# Patient Record
Sex: MALE | HISPANIC OR LATINO | Employment: FULL TIME | URBAN - NONMETROPOLITAN AREA
[De-identification: names, ages, dates, MRNs, and addresses within clinical notes are randomized per-mention and may not be internally consistent; named-entity substitution may affect disease eponyms.]

---

## 2017-04-03 ENCOUNTER — OCCUPATIONAL MEDICINE (OUTPATIENT)
Dept: URGENT CARE | Facility: PHYSICIAN GROUP | Age: 42
End: 2017-04-03
Payer: COMMERCIAL

## 2017-04-03 VITALS
OXYGEN SATURATION: 96 % | RESPIRATION RATE: 16 BRPM | TEMPERATURE: 98.8 F | HEIGHT: 66 IN | WEIGHT: 130 LBS | DIASTOLIC BLOOD PRESSURE: 70 MMHG | SYSTOLIC BLOOD PRESSURE: 110 MMHG | HEART RATE: 82 BPM | BODY MASS INDEX: 20.89 KG/M2

## 2017-04-03 DIAGNOSIS — S05.02XA CONJUNCTIVAL ABRASION, LEFT, INITIAL ENCOUNTER: ICD-10-CM

## 2017-04-03 LAB
BREATH ALCOHOL COMMENT: NORMAL
POC BREATHALIZER: 0 PERCENT (ref 0–0.01)

## 2017-04-03 PROCEDURE — 82075 ASSAY OF BREATH ETHANOL: CPT | Mod: 29 | Performed by: PHYSICIAN ASSISTANT

## 2017-04-03 PROCEDURE — 99204 OFFICE O/P NEW MOD 45 MIN: CPT | Mod: 29 | Performed by: PHYSICIAN ASSISTANT

## 2017-04-03 PROCEDURE — 80300: CPT | Performed by: PHYSICIAN ASSISTANT

## 2017-04-03 NOTE — Clinical Note
[facility]   [facility] [place] [address]  Phone: [phone] - Fax: [phone]        [facility] Progress Report and Disability Certification  Date of Service: 4/3/2017   No Show:  No  Date / Time of Next Visit: 4/5/2017   Claim Information   Patient Name: Cesar James  Claim Number:     Employer: [employer]  Date of Injury: 4/1/2017     Insurer / TPA: Bright Mckenzie  ID / SSN:     Occupation:   Diagnosis: The encounter diagnosis was Conjunctival abrasion, left, initial encounter.    Medical Information   Related to Industrial Injury?   Yes   Subjective Complaints:  Bilateral eye redness, foreign body sensation after getting metal shavings in at work 2 days ago   Objective Findings: Eyes: PERRLA, EOM within normal limits, he has mild bilateral conjunctival injection, left worse than right. Both eyes were stained with fluorescein stain, minimal conjunctival abrasion to the inferolateral aspect of his eye, just distal to his iris   Pre-Existing Condition(s):     Assessment:   Initial Visit    Status: Additional Care Required  Comments:follow-up in 2 days  Permanent Disability:     Plan: Medication    Diagnostics:      Comments:       Disability Information   Status: Released to Full Duty    From:  4/3/2017  Through: 4/5/2017 Restrictions are: Temporary   Physical Restrictions   Sitting:    Standing:    Stooping:    Bending:      Squatting:    Walking:    Climbing:    Pushing:      Pulling:    Other:    Reaching Above Shoulder (L):   Reaching Above Shoulder (R):       Reaching Below Shoulder (L):    Reaching Below Shoulder (R):      Not to exceed Weight Limits   Carrying(hrs):   Weight Limit(lb):   Lifting(hrs):   Weight  Limit(lb):     Comments:      Repetitive Actions   Hands: i.e. Fine Manipulations from Grasping:     Feet: i.e. Operating Foot Controls:     Driving / Operate Machinery:     Physician Name: Olaf De La Cruz PA-C  Physician Signature: butch-SHI Leon PA-C e-Signature: Dr. Kang Medellin, Medical Director   Clinic Name / Location: [address] [address] Clinic Phone Number: Dept: [phone]   Appointment Time: 5:00 Pm Visit Start Time: 5:43 PM   Check-In Time:  5:14 Pm Visit Discharge Time:  6:02 PM   Original-Treating Physician or Chiropractor    Page 2-Insurer/TPA    Page 3-Employer    Page 4-Employee

## 2017-04-03 NOTE — MR AVS SNAPSHOT
"        Cesar James   4/3/2017 5:00 PM   Occupational Medicine   MRN: [id number]    Department:  [facility]   Dept Phone:  [phone]    Description:  Male : 1975   Provider:  Olaf De La Cruz PA-C           Reason for Visit     Other DOI 2017 metal in eyes      Allergies as of 4/3/2017     Not on File      You were diagnosed with     Conjunctival abrasion, left, initial encounter   [[id number]]         Vital Signs     Blood Pressure Pulse Temperature Respirations Height Weight    110/70 mmHg 82 37.1 °C (98.8 °F) 16 1.676 m (5' 6\") 58.968 kg (130 lb)    Body Mass Index Oxygen Saturation Smoking Status             20.99 kg/m2 96% Current Every Day Smoker         Basic Information     Date Of Birth Sex Race Ethnicity Preferred Language    1975 Male  or   Origin (Guyanese,Montenegrin,Ivorian,Alejandro, etc) English      Your appointments     2017 10:00 AM   Workers Compensation with [facility] (--)    [address] [address] [phone]              Health Maintenance     Patient has no pending health maintenance at this time      Results     POCT Breath Alcohol Test      Component Value Standard Range & Units    POC Breathalizer 0.000 0.00 - 0.01 Percent    Breath Alcohol Comment                          Current Immunizations     No immunizations on file.      Below and/or attached are the medications your provider expects you to take. Review all of your home medications and newly ordered medications with your provider and/or pharmacist. Follow medication instructions as directed by your provider and/or pharmacist. Please keep your medication list with you and share with your provider. Update the information when medications are discontinued, doses are changed, or new medications (including over-the-counter products) are added; and carry medication information at all times in the event of emergency situations    " Allergies:  No Known Allergies          Medications  Valid as of: April 03, 2017 -  6:13 PM    Generic Name Brand Name Tablet Size Instructions for use    Gentamicin Sulfate (Ointment) GARAMYCIN 0.3 % Place 0.5 Inches in both eyes 3 times a day.        .                 Medicines prescribed today were sent to:     [facility], [place] - [address] [facility] [address]    Phone: [phone] Fax: [phone]    Open 24 Hours?: No      Medication refill instructions:       If your prescription bottle indicates you have medication refills left, it is not necessary to call your provider’s office. Please contact your pharmacy and they will refill your medication.    If your prescription bottle indicates you do not have any refills left, you may request refills at any time through one of the following ways: The online On Networks system (except Urgent Care), by calling your provider’s office, or by asking your pharmacy to contact your provider’s office with a refill request. Medication refills are processed only during regular business hours and may not be available until the next business day. Your provider may request additional information or to have a follow-up visit with you prior to refilling your medication.   *Please Note: Medication refills are assigned a new Rx number when refilled electronically. Your pharmacy may indicate that no refills were authorized even though a new prescription for the same medication is available at the pharmacy. Please request the medicine by name with the pharmacy before contacting your provider for a refill.           On Networks Access Code: [email or web address]  Expires: 5/3/2017  6:13 PM    On Networks  A secure, online tool to manage your health information     [employer]’s On Networks® is a secure, online tool that connects you to your personalized health information from the privacy of your home -- day or night - making it very easy for you to manage  your healthcare. Once the activation process is completed, you can even access your medical information using the FX Bridge josh, which is available for free in the Apple Josh store or Google Play store.     FX Bridge provides the following levels of access (as shown below):   My Chart Features   [facility] Primary [facility] Doctor [facility]  Specialists [facility]  Urgent  Care Non-[facility]  Primary Care  Doctor   Email your healthcare team securely and privately 24/7 X X X    Manage appointments: schedule your next appointment; view details of past/upcoming appointments X      Request prescription refills. X      View recent personal medical records, including lab and immunizations X X X X   View health record, including health history, allergies, medications X X X X   Read reports about your outpatient visits, procedures, consult and ER notes X X X X   See your discharge summary, which is a recap of your hospital and/or ER visit that includes your diagnosis, lab results, and care plan. X X       How to register for FX Bridge:  1. Go to  [email or web address].  2. Click on the Sign Up Now box, which takes you to the New Member Sign Up page. You will need to provide the following information:  a. Enter your FX Bridge Access Code exactly as it appears at the top of this page. (You will not need to use this code after you’ve completed the sign-up process. If you do not sign up before the expiration date, you must request a new code.)   b. Enter your date of birth.   c. Enter your home email address.   d. Click Submit, and follow the next screen’s instructions.  3. Create a FX Bridge ID. This will be your FX Bridge login ID and cannot be changed, so think of one that is secure and easy to remember.  4. Create a FX Bridge password. You can change your password at any time.  5. Enter your Password Reset Question and Answer. This can be used at a later time if you forget your password.   6. Enter your e-mail address. This allows you to receive  e-mail notifications when new information is available in Chideo.  7. Click Sign Up. You can now view your health information.    For assistance activating your Chideo account, call [phone]        Quit Tobacco Information     Do you want to quit using tobacco?    Quitting tobacco decreases risks of cancer, heart and lung disease, increases life expectancy, improves sense of taste and smell, and increases spending money, among other benefits.    If you are thinking about quitting, we can help.  • [facility] Quit Tobacco Program: [phone]  o Program occurs weekly for four weeks and includes pharmacist consultation on products to support quitting smoking or chewing tobacco. A provider referral is needed for pharmacist consultation.  • Tobacco Users Help Hotline: 4-426-QUIT-NOW (710-3451) or https://[place].quitlogix.org/  o Free, confidential telephone and online coaching for [place] residents. Sessions are designed on a schedule that is convenient for you. Eligible clients receive free nicotine replacement therapy.  • Nationally: www.smokefree.gov  o Information and professional assistance to support both immediate and long-term needs as you become, and remain, a non-smoker. Smokefree.gov allows you to choose the help that best fits your needs.

## 2017-04-03 NOTE — Clinical Note
"EMPLOYEE’S CLAIM FOR COMPENSATION/ REPORT OF INITIAL TREATMENT  FORM C-4    EMPLOYEE’S CLAIM - PROVIDE ALL INFORMATION REQUESTED   First Name  Cesar Last Name  Jacob Birthdate                    1975                Sex  male Claim Number   Home Address  [address] Age  41 y.o. Height  1.676 m (5' 6\") Weight  58.968 kg (130 lb) N     [facility] [facility] [place]  [address] Telephone  [phone] (home)    Mailing Address  [address] [facility] [place]  [address] Primary Language Spoken  English    Insurer  Bright Mckenzie Third Party   Bright Mckenzie   Employee's Occupation (Job Title) When Injury or Occupational Disease Occurred      Employer's Name  [employer]  Telephone  [phone]    Employer Address  [address]  [place]  Zip  [address]    Date of Injury  4/1/2017               Hour of Injury  9:00 AM Date Employer Notified  4/1/2017 Last Day of Work after Injury or Occupational Disease  4/3/2017 Supervisor to Whom Injury Reported  Tawanda Escobedo   Address or Location of Accident (if applicable)  [[address] belt]   What were you doing at the time of accident? (if applicable)  Hammering on a  bearing    How did this injury or occupational disease occur? (Be specific an answer in detail. Use additional sheet if necessary)  I was hammering a bearing off a shaft and it broke off sending a piece of cast iron under my safety glasses into eyes   If you believe that you have an occupational disease, when did you first have knowledge of the disability and it relationship to your employment?  NO Witnesses to the Accident  Wilmer Sotelo      Nature of Injury or Occupational Disease  Foreign Body  Part(s) of Body Injured or Affected  Eye (L), Eye (R),     I certify that the above is true and correct to the best of my knowledge and that I have provided this " information in order to obtain the benefits of [place] or [place] of [place]).  I hereby authorize any physician, chiropractor, surgeon, practitioner, or other person, any hospital, including [facility] or [facility], any medical service organization, any insurance company, or other institution or organization to release to each other, any medical or other information, including benefits paid or payable, pertinent to this injury or disease, except information relative to diagnosis, treatment and/or counseling for AIDS, psychological conditions, alcohol or controlled substances, for which I must give specific authorization.  A Photostat of this authorization shall be as valid as the original.     Date   Place   Employee’s Signature   THIS REPORT MUST BE COMPLETED AND MAILED WITHIN 3 WORKING DAYS OF TREATMENT   Place  [facility]  Name of Facility  [facility]   Date  4/3/2017 Diagnosis  (S05.02XA) Conjunctival abrasion, left, initial encounter Is there evidence the injured employee was under the influence of alcohol and/or another controlled substance at the time of accident?   Hour  5:43 PM Description of Injury or Disease  The encounter diagnosis was Conjunctival abrasion, left, initial encounter.     Treatment  Prescription eye ointment  Have you advised the patient to remain off work five days or more? No   X-Ray Findings      If Yes   From Date  To Date      From information given by the employee, together with medical evidence, can you directly connect this injury or occupational disease as job incurred?  Yes If No Full Duty  Yes Modified Duty      Is additional medical care by a physician indicated?  Yes  Comments:follow-up in 2 days If Modified Duty, Specify any Limitations / Restrictions      Do you know of any previous injury or disease contributing to this condition or occupational disease?         "                    No   Date  4/3/2017 Print Doctor’s Name Shi De La Cruz PA-C I certify the employer’s copy of  this form was mailed on:   Address  [address] Insurer’s Use Only     [facility]  [address]    Provider’s Tax ID Number  [id number]  Telephone  Dept: [phone]        e-SignSTASHI GUEVARA PA-C   e-Signature: Dr. Kang Medellin, Medical Director Degree           ORIGINAL-TREATING PHYSICIAN OR CHIROPRACTOR    PAGE 2-INSURER/TPA    PAGE 3-EMPLOYER    PAGE 4-EMPLOYEE             Form C-4 (rev10/07)              BRIEF DESCRIPTION OF RIGHTS AND BENEFITS  (Pursuant to [place] 616C.050)    Notice of Injury or Occupational Disease (Incident Report Form C-1): If an injury or occupational disease (OD) arises out of and in the  course of employment, you must provide written notice to your employer as soon as practicable, but no later than 7 days after the accident or  OD. Your employer shall maintain a sufficient supply of the required forms.    Claim for Compensation (Form C-4): If medical treatment is sought, the form C-4 is available at the place of initial treatment. A completed  \"Claim for Compensation\" (Form C-4) must be filed within 90 days after an accident or OD. The treating physician or chiropractor must,  within 3 working days after treatment, complete and mail to the employer, the employer's insurer and third-party , the Claim for  Compensation.    Medical Treatment: If you require medical treatment for your on-the-job injury or OD, you may be required to select a physician or  chiropractor from a list provided by your workers’ compensation insurer, if it has contracted with an Organization for Managed Care (MCO) or  Preferred Provider Organization (PPO) or providers of health care. If your employer has not entered into a contract with an MCO or PPO, you  may select a physician or chiropractor from the Panel of Physicians and Chiropractors. Any medical costs " related to your industrial injury or  OD will be paid by your insurer.    Temporary Total Disability (TTD): If your doctor has certified that you are unable to work for a period of at least 5 consecutive days, or 5  cumulative days in a 20-day period, or places restrictions on you that your employer does not accommodate, you may be entitled to TTD  compensation.    Temporary Partial Disability (TPD): If the wage you receive upon reemployment is less than the compensation for TTD to which you are  entitled, the insurer may be required to pay you TPD compensation to make up the difference. TPD can only be paid for a maximum of 24  months.    Permanent Partial Disability (PPD): When your medical condition is stable and there is an indication of a PPD as a result of your injury or  OD, within 30 days, your insurer must arrange for an evaluation by a rating physician or chiropractor to determine the degree of your PPD. The  amount of your PPD award depends on the date of injury, the results of the PPD evaluation and your age and wage.    Permanent Total Disability (PTD): If you are medically certified by a treating physician or chiropractor as permanently and totally disabled  and have been granted a PTD status by your insurer, you are entitled to receive monthly benefits not to exceed 66 2/3% of your average  monthly wage. The amount of your PTD payments is subject to reduction if you previously received a PPD award.    Vocational Rehabilitation Services: You may be eligible for vocational rehabilitation services if you are unable to return to the job due to a  permanent physical impairment or permanent restrictions as a result of your injury or occupational disease.    Transportation and Per Trent Reimbursement: You may be eligible for travel expenses and per trent associated with medical treatment.    Reopening: You may be able to reopen your claim if your condition worsens after claim closure.    Appeal Process: If you  disagree with a written determination issued by the insurer or the insurer does not respond to your request, you may  appeal to the Department of Administration, , by following the instructions contained in your determination letter. You must  appeal the determination within 70 days from the date of the determination letter at [address], [address], [place], [place]  [address], or [address], [place], [place] [address]. If you disagree with the  decision, you may appeal to the  Department of Administration, . You must file your appeal within 30 days from the date of the  decision  letter at [address], [address], [place], [place] [address], or [address] [facility], [address], [place], [place] [address]. If you  disagree with a decision of an , you may file a petition for judicial review with the District Court. You must do so within 30  days of the Appeal Officer’s decision. You may be represented by an  at your own expense or you may contact the [facility] for possible  representation.    [place]  for Injured Workers (NAIW): If you disagree with a  decision, you may request that NAIW represent you  without charge at an  Hearing. For information regarding denial of benefits, you may contact the [facility] at: [address], [address], [place] [address], [phone], or [address], [place] [address], [phone]    To File a Complaint with the Division: If you wish to file a complaint with the  of the Division of Industrial Relations (DIR),  please contact the Workers’ Compensation Section, [address], [address], [place], [place] [address], telephone [phone], or  [address] [facility] [address], [place], [place] [address], telephone [phone].    For assistance with Workers’ Compensation  Issues: you may contact the Office of the Governor Consumer Health Assistance, [address] [place] [address], [place] [address], [place] [address], Toll Free 1-464.788.9967, Web site: http://govcha.[facility].nv., E-mail  [email or web address].[email or web address].                                                                                                                                                                                                                                   __________________________________________________________________                                                                   _________________                Employee Name / Signature                                                                                                                                                       Date                                                                                                                                                                                                     D-2 (rev. 10/07)

## 2017-04-04 NOTE — PROGRESS NOTES
"Chief Complaint   Patient presents with   • Other     DOI 4/01/2017 metal in eyes       HISTORY OF PRESENT ILLNESS: Patient is a 41 y.o. male who presents today because he feels like he has something in his left eye. He is at work 2 days ago and was having a hammer against a ball bearing and felt a piece of metal break, he felt like he has metal shavings in his eye. He did immediately rinsed his eye. He was wearing safety glasses. He comes in today with eye redness, irritation, foreign body sensation in the left, and to a lesser extent the right. He has been flushing his eye regularly since the incident.    There are no active problems to display for this patient.      Allergies:Review of patient's allergies indicates not on file.    Current Outpatient Prescriptions Ordered in Typekit   Medication Sig Dispense Refill   • gentamicin (GARAMYCIN) 0.3 % Ointment Place 0.5 Inches in both eyes 3 times a day. 1 Tube 0     No current Epic-ordered facility-administered medications on file.       History reviewed. No pertinent past medical history.    Social History   Substance Use Topics   • Smoking status: Current Every Day Smoker -- 0.50 packs/day for 3 years     Types: Cigarettes   • Smokeless tobacco: Never Used   • Alcohol Use: Yes       No family status information on file.   History reviewed. No pertinent family history.    ROS:  Review of Systems   Constitutional: Negative for fever, chills, weight loss and malaise/fatigue.   HENT: Negative for ear pain, nosebleeds, congestion, sore throat and neck pain.    Eyes: Positive for mildly blurred vision, redness and irritation to his eyes bilaterally, left worse than right  Respiratory: Negative for cough, sputum production, shortness of breath and wheezing.    Cardiovascular: Negative for chest pain, palpitations, orthopnea and leg swelling.     Exam:  Blood pressure 110/70, pulse 82, temperature 37.1 °C (98.8 °F), resp. rate 16, height 1.676 m (5' 6\"), weight 58.968 kg (130 " lb), SpO2 96 %.  General:  Well nourished, well developed male in NAD  Head:Normocephalic, atraumatic  Eyes: PERRLA, EOM within normal limits, he has mild bilateral conjunctival injection, left worse than right. Both eyes were stained with fluorescein stain, minimal conjunctival abrasion to the inferolateral aspect of his eye, just distal to his iris  Extremities: no clubbing, cyanosis, or edema.    Please note that this dictation was created using voice recognition software. I have made every reasonable attempt to correct obvious errors, but I expect that there are errors of grammar and possibly content that I did not discover before finalizing the note.    Assessment/Plan:  1. Conjunctival abrasion, left, initial encounter  gentamicin (GARAMYCIN) 0.3 % Ointment     Follow-up in 2 days  .

## 2017-04-05 ENCOUNTER — OCCUPATIONAL MEDICINE (OUTPATIENT)
Dept: URGENT CARE | Facility: PHYSICIAN GROUP | Age: 42
End: 2017-04-05
Payer: COMMERCIAL

## 2017-04-05 VITALS
HEIGHT: 66 IN | TEMPERATURE: 98.6 F | DIASTOLIC BLOOD PRESSURE: 68 MMHG | HEART RATE: 72 BPM | WEIGHT: 130 LBS | RESPIRATION RATE: 18 BRPM | BODY MASS INDEX: 20.89 KG/M2 | SYSTOLIC BLOOD PRESSURE: 114 MMHG | OXYGEN SATURATION: 96 %

## 2017-04-05 DIAGNOSIS — S05.02XD CONJUNCTIVAL ABRASION, LEFT, SUBSEQUENT ENCOUNTER: ICD-10-CM

## 2017-04-05 PROCEDURE — 99213 OFFICE O/P EST LOW 20 MIN: CPT | Performed by: PHYSICIAN ASSISTANT

## 2017-04-05 ASSESSMENT — ENCOUNTER SYMPTOMS
FEVER: 0
DOUBLE VISION: 0
NAUSEA: 0
FOREIGN BODY SENSATION: 0
PHOTOPHOBIA: 0
EYE REDNESS: 1
EYE DISCHARGE: 0
VOMITING: 0
BLURRED VISION: 0
EYE PAIN: 1

## 2017-04-05 NOTE — MR AVS SNAPSHOT
"        Cesar James   2017 10:00 AM   Occupational Medicine   MRN: [id number]    Department:  [facility]   Dept Phone:  [phone]    Description:  Male : 1975   Provider:  JENA Webster           Reason for Visit     Eye Injury WC FV      Allergies as of 2017     No Known Allergies      You were diagnosed with     Conjunctival abrasion, left, subsequent encounter   [[id number]]   Much improved. MMI      Vital Signs     Blood Pressure Pulse Temperature Respirations Height Weight    114/68 mmHg 72 37 °C (98.6 °F) 18 1.676 m (5' 5.98\") 58.968 kg (130 lb)    Body Mass Index Oxygen Saturation Smoking Status             20.99 kg/m2 96% Current Every Day Smoker         Basic Information     Date Of Birth Sex Race Ethnicity Preferred Language    1975 Male  or   Origin (Chilean,Barbadian,Jordanian,Alejandro, etc) English      Health Maintenance        Date Due Completion Dates    IMM DTaP/Tdap/Td Vaccine (1 - Tdap) 6/10/1996 ---            Current Immunizations     No immunizations on file.      Below and/or attached are the medications your provider expects you to take. Review all of your home medications and newly ordered medications with your provider and/or pharmacist. Follow medication instructions as directed by your provider and/or pharmacist. Please keep your medication list with you and share with your provider. Update the information when medications are discontinued, doses are changed, or new medications (including over-the-counter products) are added; and carry medication information at all times in the event of emergency situations     Allergies:  No Known Allergies          Medications  Valid as of: 2017 - 10:36 AM    Generic Name Brand Name Tablet Size Instructions for use    Gentamicin Sulfate (Ointment) GARAMYCIN 0.3 % Place 0.5 Inches in both eyes 3 times a day.        .                 Medicines prescribed today were sent to:     [facility] " # - [place], [place] - [address]    [address] [address]    Phone: [phone] Fax: [phone]    Open 24 Hours?: No      Medication refill instructions:       If your prescription bottle indicates you have medication refills left, it is not necessary to call your provider’s office. Please contact your pharmacy and they will refill your medication.    If your prescription bottle indicates you do not have any refills left, you may request refills at any time through one of the following ways: The online Investview system (except Urgent Care), by calling your provider’s office, or by asking your pharmacy to contact your provider’s office with a refill request. Medication refills are processed only during regular business hours and may not be available until the next business day. Your provider may request additional information or to have a follow-up visit with you prior to refilling your medication.   *Please Note: Medication refills are assigned a new Rx number when refilled electronically. Your pharmacy may indicate that no refills were authorized even though a new prescription for the same medication is available at the pharmacy. Please request the medicine by name with the pharmacy before contacting your provider for a refill.           Investview Access Code: [email or web address]  Expires: 5/3/2017  6:13 PM    Investview  A secure, online tool to manage your health information     [employer] Investview® is a secure, online tool that connects you to your personalized health information from the privacy of your home -- day or night - making it very easy for you to manage your healthcare. Once the activation process is completed, you can even access your medical information using the Investview josh, which is available for free in the Apple Josh store or Google Play store.     Investview provides the following levels of access (as shown below):   My Chart Features   [facility] Primary Care Doctor  [facility]  Specialists [facility]  Urgent  Care Non-[facility]  Primary Care  Doctor   Email your healthcare team securely and privately 24/7 X X X    Manage appointments: schedule your next appointment; view details of past/upcoming appointments X      Request prescription refills. X      View recent personal medical records, including lab and immunizations X X X X   View health record, including health history, allergies, medications X X X X   Read reports about your outpatient visits, procedures, consult and ER notes X X X X   See your discharge summary, which is a recap of your hospital and/or ER visit that includes your diagnosis, lab results, and care plan. X X       How to register for Causecast:  1. Go to  [email or web address].  2. Click on the Sign Up Now box, which takes you to the New Member Sign Up page. You will need to provide the following information:  a. Enter your Causecast Access Code exactly as it appears at the top of this page. (You will not need to use this code after you’ve completed the sign-up process. If you do not sign up before the expiration date, you must request a new code.)   b. Enter your date of birth.   c. Enter your home email address.   d. Click Submit, and follow the next screen’s instructions.  3. Create a Causecast ID. This will be your Causecast login ID and cannot be changed, so think of one that is secure and easy to remember.  4. Create a Causecast password. You can change your password at any time.  5. Enter your Password Reset Question and Answer. This can be used at a later time if you forget your password.   6. Enter your e-mail address. This allows you to receive e-mail notifications when new information is available in Causecast.  7. Click Sign Up. You can now view your health information.    For assistance activating your Causecast account, call [phone]        Quit Tobacco Information     Do you want to quit using tobacco?    Quitting tobacco decreases risks of cancer, heart  and lung disease, increases life expectancy, improves sense of taste and smell, and increases spending money, among other benefits.    If you are thinking about quitting, we can help.  • [facility] Quit Tobacco Program: [phone]  o Program occurs weekly for four weeks and includes pharmacist consultation on products to support quitting smoking or chewing tobacco. A provider referral is needed for pharmacist consultation.  • Tobacco Users Help Hotline: -800QUIT-NOW (392-6068) or https://[place].quitlogix.org/  o Free, confidential telephone and online coaching for [place] residents. Sessions are designed on a schedule that is convenient for you. Eligible clients receive free nicotine replacement therapy.  • Nationally: www.smokefree.gov  o Information and professional assistance to support both immediate and long-term needs as you become, and remain, a non-smoker. Smokefree.gov allows you to choose the help that best fits your needs.

## 2017-04-05 NOTE — Clinical Note
[facility]   [address] [place] [address] [place]  ALEXANDRA Chavez [address]  Phone: [phone] - Fax: [phone]        [facility] Progress Report and Disability Certification  Date of Service: 4/5/2017   No Show:  No  Date / Time of Next Visit:     Claim Information   Patient Name: Cesar James  Claim Number:     Employer: [employer]  Date of Injury: 4/1/2017     Insurer / TPA: [employer] Mckenzie  ID / SSN:     Occupation:   Diagnosis: The encounter diagnosis was Conjunctival abrasion, left, subsequent encounter.    Medical Information   Related to Industrial Injury? Yes    Subjective Complaints:  DOI: 4/1/17. Patient reports feeling as if he got metal in his eye while working. He was seen in urgent care 2 days ago and had some abrasions present. He was started on gentamicin ointment, but unfortunately was unable to start the medication until last night at the pharmacy had run out. He has had one dose of the medication and has noticed improvement in his symptoms. Reports occasional mild irritation, but no pain. No changes in vision.   Objective Findings: Very mild conjunctival erythema of the left eye. No obvious abrasions on fluorescein exam. No foreign bodies visualized.   Pre-Existing Condition(s):     Assessment:   Condition Improved    Status: Discharged /  MMI  Permanent Disability:No    Plan:      Diagnostics:      Comments:       Disability Information   Status:      From:     Through:   Restrictions are:     Physical Restrictions   Sitting:    Standing:    Stooping:    Bending:      Squatting:    Walking:    Climbing:    Pushing:      Pulling:    Other:    Reaching Above Shoulder (L):   Reaching Above Shoulder (R):       Reaching Below Shoulder (L):    Reaching Below Shoulder (R):      Not to exceed Weight Limits   Carrying(hrs):   Weight Limit(lb):   Lifting(hrs):   Weight  Limit(lb):     Comments:      Repetitive Actions   Hands: i.e. Fine Manipulations  from Grasping:     Feet: i.e. Operating Foot Controls:     Driving / Operate Machinery:     Physician Name: JENA Webster Physician Signature: KVNG Abad e-Signature: Dr. Kang Medellin, Medical Director   Clinic Name / Location: [address] [address] Clinic Phone Number: Dept: [phone]   Appointment Time: 10:00 Am Visit Start Time: 10:12 AM   Check-In Time:  10:05 Am Visit Discharge Time:  10:35 AM   Original-Treating Physician or Chiropractor    Page 2-Insurer/TPA    Page 3-Employer    Page 4-Employee

## 2017-04-05 NOTE — PROGRESS NOTES
Subjective:      Cesar James is a 41 y.o. male who presents with Eye Injury      DOI: 4/1/17. Patient reports feeling as if he got metal in his eye while working. He was seen in urgent care 2 days ago and had some abrasions present. He was started on gentamicin ointment, but unfortunately was unable to start the medication until last night at the pharmacy had run out. He has had one dose of the medication and has noticed improvement in his symptoms. Reports occasional mild irritation, but no pain. No changes in vision.     Eye Injury   The left eye is affected. This is a new problem. The current episode started in the past 7 days. The problem has been gradually improving. The injury mechanism was a foreign body. The patient is experiencing no pain. Associated symptoms include eye redness. Pertinent negatives include no blurred vision, eye discharge, double vision, fever, foreign body sensation, itching, nausea, photophobia, recent URI or vomiting. He has tried commercial eye wash and eye drops for the symptoms. The treatment provided moderate relief.       Review of Systems   Constitutional: Negative for fever.   Eyes: Positive for pain and redness. Negative for blurred vision, double vision, photophobia and discharge.   Gastrointestinal: Negative for nausea and vomiting.   Skin: Negative for itching.     Allergies:Review of patient's allergies indicates no known allergies.    Current Outpatient Prescriptions Ordered in [facility]   Medication Sig Dispense Refill   • gentamicin (GARAMYCIN) 0.3 % Ointment Place 0.5 Inches in both eyes 3 times a day. 1 Tube 0     No current Epic-ordered facility-administered medications on file.       History reviewed. No pertinent past medical history.    Social History   Substance Use Topics   • Smoking status: Current Every Day Smoker -- 0.50 packs/day for 3 years     Types: Cigarettes   • Smokeless tobacco: Never Used   • Alcohol Use: Yes       No family status information on file.  "  History reviewed. No pertinent family history.       Objective:     /68 mmHg  Pulse 72  Temp(Src) 37 °C (98.6 °F)  Resp 18  Ht 1.676 m (5' 5.98\")  Wt 58.968 kg (130 lb)  BMI 20.99 kg/m2  SpO2 96%     Physical Exam   Constitutional: He is oriented to person, place, and time. He appears well-developed and well-nourished. No distress.   HENT:   Head: Normocephalic and atraumatic.   Eyes: EOM are normal. Pupils are equal, round, and reactive to light. Right eye exhibits no discharge. Left eye exhibits no discharge.   Mild left conjunctival erythema. No obvious foreign bodies or abrasions   Neck: Normal range of motion. Neck supple.   Cardiovascular: Normal rate.    Pulmonary/Chest: Effort normal.   Neurological: He is alert and oriented to person, place, and time.   Skin: Skin is warm and dry. He is not diaphoretic.   Psychiatric: He has a normal mood and affect. His behavior is normal. Judgment and thought content normal.   Nursing note and vitals reviewed.      Very mild conjunctival erythema of the left eye. No obvious abrasions on fluorescein exam. No foreign bodies visualized.       Assessment/Plan:     1. Conjunctival abrasion, left, subsequent encounter      Much improved. MMI       Please note that this dictation was created using voice recognition software. I have made every reasonable attempt to correct obvious errors, but I expect that there are errors of grammar and possibly content that I did not discover before finalizing the note.      "

## 2019-07-15 ENCOUNTER — NON-PROVIDER VISIT (OUTPATIENT)
Dept: URGENT CARE | Facility: PHYSICIAN GROUP | Age: 44
End: 2019-07-15

## 2019-07-15 DIAGNOSIS — Z02.1 PRE-EMPLOYMENT DRUG SCREENING: ICD-10-CM

## 2019-07-15 LAB
AMP AMPHETAMINE: NORMAL
COC COCAINE: NORMAL
INT CON NEG: NORMAL
INT CON POS: NORMAL
MET METHAMPHETAMINES: NORMAL
OPI OPIATES: NORMAL
PCP PHENCYCLIDINE: NORMAL
POC DRUG COMMENT 753798-OCCUPATIONAL HEALTH: NEGATIVE
THC: NORMAL

## 2019-07-15 PROCEDURE — 80305 DRUG TEST PRSMV DIR OPT OBS: CPT | Performed by: PHYSICIAN ASSISTANT

## 2021-01-12 ENCOUNTER — OFFICE VISIT (OUTPATIENT)
Dept: URGENT CARE | Facility: PHYSICIAN GROUP | Age: 46
End: 2021-01-12
Payer: COMMERCIAL

## 2021-01-12 ENCOUNTER — HOSPITAL ENCOUNTER (OUTPATIENT)
Facility: MEDICAL CENTER | Age: 46
End: 2021-01-12
Attending: PHYSICIAN ASSISTANT
Payer: COMMERCIAL

## 2021-01-12 VITALS
TEMPERATURE: 97.9 F | WEIGHT: 135 LBS | SYSTOLIC BLOOD PRESSURE: 112 MMHG | RESPIRATION RATE: 16 BRPM | HEART RATE: 97 BPM | BODY MASS INDEX: 21.69 KG/M2 | HEIGHT: 66 IN | DIASTOLIC BLOOD PRESSURE: 86 MMHG | OXYGEN SATURATION: 97 %

## 2021-01-12 DIAGNOSIS — R68.89 FLU-LIKE SYMPTOMS: ICD-10-CM

## 2021-01-12 DIAGNOSIS — R06.2 WHEEZING: ICD-10-CM

## 2021-01-12 PROCEDURE — U0003 INFECTIOUS AGENT DETECTION BY NUCLEIC ACID (DNA OR RNA); SEVERE ACUTE RESPIRATORY SYNDROME CORONAVIRUS 2 (SARS-COV-2) (CORONAVIRUS DISEASE [COVID-19]), AMPLIFIED PROBE TECHNIQUE, MAKING USE OF HIGH THROUGHPUT TECHNOLOGIES AS DESCRIBED BY CMS-2020-01-R: HCPCS

## 2021-01-12 PROCEDURE — U0005 INFEC AGEN DETEC AMPLI PROBE: HCPCS

## 2021-01-12 PROCEDURE — 99204 OFFICE O/P NEW MOD 45 MIN: CPT | Performed by: PHYSICIAN ASSISTANT

## 2021-01-12 RX ORDER — DEXAMETHASONE 6 MG/1
6 TABLET ORAL DAILY
Qty: 7 TAB | Refills: 0 | Status: SHIPPED | OUTPATIENT
Start: 2021-01-12 | End: 2021-01-19

## 2021-01-12 NOTE — PROGRESS NOTES
Chief Complaint   Patient presents with   • Coronavirus Screening     fever, headache, body aches, chills, symptoms started on sunday. nausea and vomiting.        HISTORY OF PRESENT ILLNESS: Patient is a 45 y.o. male who presents today for the following:    Fever x 2 days  + body aches, chills, fatigue  Denies cough, SOB  Negative COVID test 1 week ago  OTC meds today: none    There are no active problems to display for this patient.      Allergies:Patient has no known allergies.    Current Outpatient Medications Ordered in Epic   Medication Sig Dispense Refill   • dexamethasone (DECADRON) 6 MG Tab Take 1 Tab by mouth every day for 7 days. 7 Tab 0   • gentamicin (GARAMYCIN) 0.3 % Ointment Place 0.5 Inches in both eyes 3 times a day. (Patient not taking: Reported on 1/12/2021) 1 Tube 0     No current Epic-ordered facility-administered medications on file.        History reviewed. No pertinent past medical history.    Social History     Tobacco Use   • Smoking status: Current Every Day Smoker     Packs/day: 0.50     Years: 3.00     Pack years: 1.50     Types: Cigarettes   • Smokeless tobacco: Never Used   Substance Use Topics   • Alcohol use: Yes   • Drug use: Not on file       No family status information on file.   History reviewed. No pertinent family history.    Review of Systems:    Constitutional ROS: No unexpected change in weight, No weakness, No fatigue  Eye ROS: No recent significant change in vision, No eye pain, redness, discharge  Ear ROS: No drainage, No tinnitus or vertigo, No recent change in hearing  Mouth/Throat ROS: No teeth or gum problems, No bleeding gums, No tongue complaints  Neck ROS: No swollen glands, No significant pain in neck  Pulmonary ROS: No chronic cough, sputum, or hemoptysis, No dyspnea on exertion, No wheezing  Cardiovascular ROS: No diaphoresis, No edema, No palpitations  Musculoskeletal/Extremities ROS: No peripheral edema, No pain, redness or swelling on the  "joints  Hematologic/Lymphatic ROS: No chills, No night sweats, No weight loss  Skin/Integumentary ROS: No edema, No evidence of rash, No itching      Exam:  /86   Pulse 97   Temp 36.6 °C (97.9 °F) (Temporal)   Resp 16   Ht 1.676 m (5' 6\")   Wt 61.2 kg (135 lb)   SpO2 97%   General: Well developed, well nourished. No distress.    Eye: PERRL/EOMI; conjunctivae clear, lids normal.  ENMT: Lips without lesions, MMM. Oropharynx is clear. Bilateral TMs are within normal limits.  Pulmonary: Unlabored respiratory effort.  Diffuse inspiratory wheezes.  Cardiovascular: Regular rate and rhythm without murmur.   Neurologic: Grossly nonfocal. No facial asymmetry noted.  Lymph: No cervical lymphadenopathy noted.  Skin: Warm, dry, good turgor. No rashes in visible areas.   Psych: Normal mood. Alert and oriented to person, place and time.    Assessment/Plan:  Discussed likely viral etiology.  Vitals and exam are unremarkable.  Low suspicion for pneumonia. Patient will be tested for COVID and has been advised to quarantine until results are available.   Encouraged patient to sign [facility]. Sign up information was sent via text message.  Use all medication as directed.  Discussed appropriate over-the-counter symptomatic medication, and when to return to clinic. Follow up for worsening or persistent symptoms.  1. Flu-like symptoms  SARS-CoV-2, PCR (In-House): Collect NP OR nasal swab in VTM   2. Wheezing  dexamethasone (DECADRON) 6 MG Tab       "

## 2021-01-12 NOTE — LETTER
January 12, 2021         Patient: Cesar James   YOB: 1975   Date of Visit: 1/12/2021           To Whom it May Concern:    Cesar James was seen in my clinic on 1/12/2021.     Please excuse patient for missing school/work until COVID results are available, typically taking 1-3 days.  They have been advised to quarantine during this time.      If you have any questions or concerns, please don't hesitate to call.        Sincerely,           More Ponce P.A.-C.  Electronically Signed

## 2021-01-13 DIAGNOSIS — R68.89 FLU-LIKE SYMPTOMS: ICD-10-CM

## 2021-01-13 LAB
COVID ORDER STATUS COVID19: NORMAL
SARS-COV-2 RNA RESP QL NAA+PROBE: NOTDETECTED
SPECIMEN SOURCE: NORMAL

## 2021-06-21 ENCOUNTER — NON-PROVIDER VISIT (OUTPATIENT)
Dept: URGENT CARE | Facility: PHYSICIAN GROUP | Age: 46
End: 2021-06-21

## 2021-06-21 DIAGNOSIS — Z02.83 ENCOUNTER FOR DRUG SCREENING: ICD-10-CM

## 2021-06-21 PROCEDURE — 8907 PR URINE COLLECT ONLY: Performed by: PHYSICIAN ASSISTANT

## 2021-10-16 ENCOUNTER — OFFICE VISIT (OUTPATIENT)
Dept: URGENT CARE | Facility: PHYSICIAN GROUP | Age: 46
End: 2021-10-16
Payer: COMMERCIAL

## 2021-10-16 VITALS
DIASTOLIC BLOOD PRESSURE: 76 MMHG | HEIGHT: 66 IN | SYSTOLIC BLOOD PRESSURE: 102 MMHG | HEART RATE: 65 BPM | TEMPERATURE: 98.6 F | WEIGHT: 133 LBS | OXYGEN SATURATION: 98 % | BODY MASS INDEX: 21.38 KG/M2 | RESPIRATION RATE: 16 BRPM

## 2021-10-16 DIAGNOSIS — L08.9 LOCAL SKIN INFECTION: ICD-10-CM

## 2021-10-16 PROCEDURE — 99213 OFFICE O/P EST LOW 20 MIN: CPT | Performed by: NURSE PRACTITIONER

## 2021-10-16 NOTE — PROGRESS NOTES
Chief Complaint   Patient presents with   • Other     Scabs on nose and chin, [id number]        HISTORY OF PRESENT ILLNESS: Patient is a pleasant 46 y.o. male who presents to urgent care today with complaints of scabs to his face and neck for the past month.  The areas are mildly tender, nonpruritic.  He denies any drainage.  He is recently stopped drinking alcohol and feels his symptoms have worsened since such.  He has not tried medication for symptom relief.  He shares a home with other individuals, no others have similar symptoms.    There are no problems to display for this patient.      Allergies:Patient has no known allergies.    No current outpatient medications on file prior to visit.     No current facility-administered medications on file prior to visit.         History reviewed. No pertinent past medical history.    Social History     Tobacco Use   • Smoking status: Former Smoker     Packs/day: 0.50     Years: 3.00     Pack years: 1.50     Types: Cigarettes   • Smokeless tobacco: Never Used   Substance Use Topics   • Alcohol use: Not Currently   • Drug use: Not on file       No family status information on file.   History reviewed. No pertinent family history.    ROS:  Review of Systems   Constitutional: Negative for fever, chills, weight loss, malaise, and fatigue.   HENT: Negative for ear pain, nosebleeds, congestion, sore throat and neck pain.    Eyes: Negative for vision changes.   Neuro: Negative for headache, sensory changes, weakness, seizure, LOC.   Cardiovascular: Negative for chest pain, palpitations, orthopnea and leg swelling.   Respiratory: Negative for cough, sputum production, shortness of breath and wheezing.   Gastrointestinal: Negative for abdominal pain, nausea, vomiting or diarrhea.   Genitourinary: Negative for dysuria, urgency and frequency.  Musculoskeletal: Negative for falls, neck pain, back pain, joint pain, myalgias.   Skin: Positive for rash. Negative diaphoresis.     Exam:  BP  "102/76   Pulse 65   Temp 37 °C (98.6 °F) (Temporal)   Resp 16   Ht 1.676 m (5' 6\")   Wt 60.3 kg (133 lb)   SpO2 98%   General: well-nourished, well-developed male in NAD  Head: normocephalic, atraumatic  Eyes: PERRLA, no conjunctival injection, acuity grossly intact, lids normal.  Ears: normal shape and symmetry, no tenderness, no discharge. External canals are without any significant edema or erythema. Tympanic membranes are without any inflammation, no effusion. Gross auditory acuity is intact.  Nose: symmetrical without tenderness, no discharge.  Mouth/Throat: reasonable hygiene, no erythema, exudates or tonsillar enlargement.  Neck: no masses, range of motion within normal limits, no tracheal deviation. No obvious thyroid enlargement.   Lymph: no cervical adenopathy. No supraclavicular adenopathy.   Neuro: alert and oriented. Cranial nerves 1-12 grossly intact. No sensory deficit.   Cardiovascular: regular rate and rhythm. No edema.  Pulmonary: no distress. Chest is symmetrical with respiration, no wheezes, crackles, or rhonchi.   Musculoskeletal: no clubbing, appropriate muscle tone, gait is stable.  Skin: warm, dry, intact, no clubbing, no cyanosis.  Small, round, approximately 2 mm scab lesions to bridge of nose, ears, neck.  No active drainage, no surrounding erythema.  Psych: appropriate mood, affect, judgement.         Assessment/Plan:  1. Local skin infection  mupirocin (BACTROBAN) 2 % Ointment       Patient presents with skin lesions for the past month, suspect bacterial process.  Bactroban as directed.  Skin care discussed.  Supportive care, differential diagnoses, and indications for immediate follow-up discussed with patient.   Pathogenesis of diagnosis discussed including typical length and natural progression.   Instructed to return to clinic or nearest emergency department for any change in condition, further concerns, or worsening of symptoms.  Patient states understanding of the plan of " care and discharge instructions.  Instructed to make an appointment, for follow up, with his primary care provider.        Please note that this dictation was created using voice recognition software. I have made every reasonable attempt to correct obvious errors, but I expect that there are errors of grammar and possibly content that I did not discover before finalizing the note.      ANNETTE Gates.

## 2021-11-02 ENCOUNTER — APPOINTMENT (OUTPATIENT)
Dept: URGENT CARE | Facility: PHYSICIAN GROUP | Age: 46
End: 2021-11-02
Payer: COMMERCIAL

## 2022-08-02 ENCOUNTER — NON-PROVIDER VISIT (OUTPATIENT)
Dept: URGENT CARE | Facility: PHYSICIAN GROUP | Age: 47
End: 2022-08-02

## 2022-08-02 DIAGNOSIS — Z02.1 PRE-EMPLOYMENT DRUG SCREENING: ICD-10-CM

## 2022-08-02 PROCEDURE — 80305 DRUG TEST PRSMV DIR OPT OBS: CPT | Performed by: FAMILY MEDICINE

## 2023-09-25 ENCOUNTER — HOSPITAL ENCOUNTER (OUTPATIENT)
Dept: LAB | Facility: MEDICAL CENTER | Age: 48
End: 2023-09-25
Attending: STUDENT IN AN ORGANIZED HEALTH CARE EDUCATION/TRAINING PROGRAM
Payer: COMMERCIAL

## 2023-09-25 LAB
ALBUMIN SERPL BCP-MCNC: 4.3 G/DL (ref 3.2–4.9)
ALBUMIN/GLOB SERPL: 1.5 G/DL
ALP SERPL-CCNC: 63 U/L (ref 30–99)
ALT SERPL-CCNC: 78 U/L (ref 2–50)
ANION GAP SERPL CALC-SCNC: 9 MMOL/L (ref 7–16)
AST SERPL-CCNC: 59 U/L (ref 12–45)
BASOPHILS # BLD AUTO: 0.8 % (ref 0–1.8)
BASOPHILS # BLD: 0.08 K/UL (ref 0–0.12)
BILIRUB SERPL-MCNC: 1 MG/DL (ref 0.1–1.5)
BUN SERPL-MCNC: 10 MG/DL (ref 8–22)
CALCIUM ALBUM COR SERPL-MCNC: 9.4 MG/DL (ref 8.5–10.5)
CALCIUM SERPL-MCNC: 9.6 MG/DL (ref 8.5–10.5)
CHLORIDE SERPL-SCNC: 102 MMOL/L (ref 96–112)
CHOLEST SERPL-MCNC: 145 MG/DL (ref 100–199)
CO2 SERPL-SCNC: 31 MMOL/L (ref 20–33)
CREAT SERPL-MCNC: 1.03 MG/DL (ref 0.5–1.4)
EOSINOPHIL # BLD AUTO: 0.48 K/UL (ref 0–0.51)
EOSINOPHIL NFR BLD: 5 % (ref 0–6.9)
ERYTHROCYTE [DISTWIDTH] IN BLOOD BY AUTOMATED COUNT: 40.5 FL (ref 35.9–50)
FASTING STATUS PATIENT QL REPORTED: NORMAL
GFR SERPLBLD CREATININE-BSD FMLA CKD-EPI: 91 ML/MIN/1.73 M 2
GLOBULIN SER CALC-MCNC: 2.8 G/DL (ref 1.9–3.5)
GLUCOSE SERPL-MCNC: 111 MG/DL (ref 65–99)
HBV CORE AB SERPL QL IA: NONREACTIVE
HBV SURFACE AG SER QL: NORMAL
HCT VFR BLD AUTO: 55.2 % (ref 42–52)
HCV AB SER QL: REACTIVE
HDLC SERPL-MCNC: 33 MG/DL
HGB BLD-MCNC: 19.8 G/DL (ref 14–18)
HIV 1+2 AB+HIV1 P24 AG SERPL QL IA: NORMAL
IMM GRANULOCYTES # BLD AUTO: 0.03 K/UL (ref 0–0.11)
IMM GRANULOCYTES NFR BLD AUTO: 0.3 % (ref 0–0.9)
LDLC SERPL CALC-MCNC: 95 MG/DL
LYMPHOCYTES # BLD AUTO: 3.5 K/UL (ref 1–4.8)
LYMPHOCYTES NFR BLD: 36.3 % (ref 22–41)
MCH RBC QN AUTO: 32.1 PG (ref 27–33)
MCHC RBC AUTO-ENTMCNC: 35.9 G/DL (ref 32.3–36.5)
MCV RBC AUTO: 89.5 FL (ref 81.4–97.8)
MONOCYTES # BLD AUTO: 0.63 K/UL (ref 0–0.85)
MONOCYTES NFR BLD AUTO: 6.5 % (ref 0–13.4)
NEUTROPHILS # BLD AUTO: 4.93 K/UL (ref 1.82–7.42)
NEUTROPHILS NFR BLD: 51.1 % (ref 44–72)
NRBC # BLD AUTO: 0 K/UL
NRBC BLD-RTO: 0 /100 WBC (ref 0–0.2)
PLATELET # BLD AUTO: 249 K/UL (ref 164–446)
PMV BLD AUTO: 10.7 FL (ref 9–12.9)
POTASSIUM SERPL-SCNC: 4.3 MMOL/L (ref 3.6–5.5)
PROT SERPL-MCNC: 7.1 G/DL (ref 6–8.2)
RBC # BLD AUTO: 6.17 M/UL (ref 4.7–6.1)
SODIUM SERPL-SCNC: 142 MMOL/L (ref 135–145)
TESTOST SERPL-MCNC: 1255 NG/DL (ref 175–781)
TRIGL SERPL-MCNC: 84 MG/DL (ref 0–149)
TSH SERPL DL<=0.005 MIU/L-ACNC: 1.99 UIU/ML (ref 0.38–5.33)
WBC # BLD AUTO: 9.7 K/UL (ref 4.8–10.8)

## 2023-09-25 PROCEDURE — 85025 COMPLETE CBC W/AUTO DIFF WBC: CPT

## 2023-09-25 PROCEDURE — 84403 ASSAY OF TOTAL TESTOSTERONE: CPT

## 2023-09-25 PROCEDURE — 36415 COLL VENOUS BLD VENIPUNCTURE: CPT

## 2023-09-25 PROCEDURE — 80061 LIPID PANEL: CPT

## 2023-09-25 PROCEDURE — 80053 COMPREHEN METABOLIC PANEL: CPT

## 2023-09-25 PROCEDURE — 87522 HEPATITIS C REVRS TRNSCRPJ: CPT

## 2023-09-25 PROCEDURE — 84443 ASSAY THYROID STIM HORMONE: CPT

## 2023-09-25 PROCEDURE — 87389 HIV-1 AG W/HIV-1&-2 AB AG IA: CPT

## 2023-09-25 PROCEDURE — 87340 HEPATITIS B SURFACE AG IA: CPT

## 2023-09-25 PROCEDURE — 86803 HEPATITIS C AB TEST: CPT

## 2023-09-25 PROCEDURE — 86704 HEP B CORE ANTIBODY TOTAL: CPT

## 2023-09-27 LAB
HCV RNA SERPL NAA+PROBE-ACNC: NOT DETECTED IU/ML
HCV RNA SERPL NAA+PROBE-LOG IU: NOT DETECTED LOG IU/ML
HCV RNA SERPL QL NAA+PROBE: NOT DETECTED

## 2024-05-08 ENCOUNTER — OFFICE VISIT (OUTPATIENT)
Dept: URGENT CARE | Facility: CLINIC | Age: 49
End: 2024-05-08
Payer: COMMERCIAL

## 2024-05-08 VITALS
WEIGHT: 156.75 LBS | TEMPERATURE: 98.3 F | HEART RATE: 78 BPM | OXYGEN SATURATION: 94 % | BODY MASS INDEX: 25.19 KG/M2 | HEIGHT: 66 IN | DIASTOLIC BLOOD PRESSURE: 72 MMHG | SYSTOLIC BLOOD PRESSURE: 118 MMHG | RESPIRATION RATE: 14 BRPM

## 2024-05-08 DIAGNOSIS — J06.9 VIRAL URI WITH COUGH: ICD-10-CM

## 2024-05-08 DIAGNOSIS — H61.21 IMPACTED CERUMEN OF RIGHT EAR: ICD-10-CM

## 2024-05-08 LAB
FLUAV RNA SPEC QL NAA+PROBE: NEGATIVE
FLUBV RNA SPEC QL NAA+PROBE: NEGATIVE
RSV RNA SPEC QL NAA+PROBE: NEGATIVE
SARS-COV-2 RNA RESP QL NAA+PROBE: NEGATIVE

## 2024-05-08 PROCEDURE — 3078F DIAST BP <80 MM HG: CPT | Performed by: NURSE PRACTITIONER

## 2024-05-08 PROCEDURE — 3074F SYST BP LT 130 MM HG: CPT | Performed by: NURSE PRACTITIONER

## 2024-05-08 PROCEDURE — 0241U POCT CEPHEID COV-2, FLU A/B, RSV - PCR: CPT | Performed by: NURSE PRACTITIONER

## 2024-05-08 PROCEDURE — 99213 OFFICE O/P EST LOW 20 MIN: CPT | Performed by: NURSE PRACTITIONER

## 2024-05-08 RX ORDER — BENZONATATE 100 MG/1
100 CAPSULE ORAL 3 TIMES DAILY PRN
Qty: 30 CAPSULE | Refills: 0 | Status: SHIPPED | OUTPATIENT
Start: 2024-05-08

## 2024-05-08 RX ORDER — DEXTROMETHORPHAN HYDROBROMIDE AND PROMETHAZINE HYDROCHLORIDE 15; 6.25 MG/5ML; MG/5ML
5 SYRUP ORAL EVERY 6 HOURS PRN
Qty: 120 ML | Refills: 0 | Status: SHIPPED | OUTPATIENT
Start: 2024-05-08 | End: 2024-05-15

## 2024-05-08 RX ORDER — DILTIAZEM HYDROCHLORIDE 60 MG/1
TABLET, FILM COATED ORAL
COMMUNITY
Start: 2024-03-18

## 2024-05-08 ASSESSMENT — ENCOUNTER SYMPTOMS
DIARRHEA: 0
RHINORRHEA: 1
FEVER: 1
HEMOPTYSIS: 0
NAUSEA: 0
SORE THROAT: 1
SHORTNESS OF BREATH: 1
SPUTUM PRODUCTION: 0
WHEEZING: 1
COUGH: 1

## 2024-05-08 ASSESSMENT — FIBROSIS 4 INDEX: FIB4 SCORE: 1.23

## 2024-05-08 NOTE — LETTER
May 8, 2024         Patient: Cesar James   YOB: 1975   Date of Visit: 5/8/2024           To Whom it May Concern:    Cesar James was seen in my clinic on 5/8/2024. He may return to work on 05/09/2024. Please include associated missed work form Tuesday, 5/7/2024.    If you have any questions or concerns, please don't hesitate to call.        Sincerely,           ANNETTE Prince.  Electronically Signed

## 2024-05-08 NOTE — PROGRESS NOTES
Subjective:     Cesar James is a 48 y.o. male who presents for Nasal Congestion and Cough (X 2 days, fever off and on )      Symptoms started Monday. States he missed work on Tuesday, and needs a work note. Last fever was Monday. Has inhaler, stating he hasn't needed it. Has taken Dayquil.     Cough  This is a new problem. The current episode started in the past 7 days. Associated symptoms include a fever, rhinorrhea, a sore throat, shortness of breath and wheezing. Pertinent negatives include no ear pain or hemoptysis.       History reviewed. No pertinent past medical history.    History reviewed. No pertinent surgical history.    Social History     Socioeconomic History    Marital status:      Spouse name: Not on file    Number of children: Not on file    Years of education: Not on file    Highest education level: Not on file   Occupational History    Not on file   Tobacco Use    Smoking status: Former     Current packs/day: 0.50     Average packs/day: 0.5 packs/day for 3.0 years (1.5 ttl pk-yrs)     Types: Cigarettes    Smokeless tobacco: Never   Vaping Use    Vaping Use: Every day   Substance and Sexual Activity    Alcohol use: Not Currently    Drug use: Never    Sexual activity: Never   Other Topics Concern    Not on file   Social History Narrative    Not on file     Social Determinants of Health     Financial Resource Strain: Not on file   Food Insecurity: Not on file   Transportation Needs: Not on file   Physical Activity: Not on file   Stress: Not on file   Social Connections: Not on file   Intimate Partner Violence: Not on file   Housing Stability: Not on file        Family History   Problem Relation Age of Onset    No Known Problems Mother         No Known Allergies    Review of Systems   Constitutional:  Positive for fever and malaise/fatigue.   HENT:  Positive for rhinorrhea and sore throat. Negative for ear pain.    Respiratory:  Positive for cough, shortness of breath and wheezing. Negative  "for hemoptysis and sputum production.    Gastrointestinal:  Negative for diarrhea and nausea.   All other systems reviewed and are negative.       Objective:   /72   Pulse 78   Temp 36.8 °C (98.3 °F) (Temporal)   Resp 14   Ht 1.676 m (5' 6\")   Wt 71.1 kg (156 lb 12 oz)   SpO2 94%   BMI 25.30 kg/m²     Physical Exam  Vitals reviewed.   Constitutional:       General: He is not in acute distress.     Appearance: He is well-developed. He is not toxic-appearing.   HENT:      Head: Normocephalic and atraumatic.      Right Ear: External ear normal. No drainage, swelling or tenderness. There is impacted cerumen.      Left Ear: Tympanic membrane, ear canal and external ear normal.  No middle ear effusion. Tympanic membrane is not erythematous.      Nose: Rhinorrhea present.      Mouth/Throat:      Mouth: Mucous membranes are moist.      Pharynx: Posterior oropharyngeal erythema present. No oropharyngeal exudate.   Eyes:      Conjunctiva/sclera: Conjunctivae normal.   Cardiovascular:      Rate and Rhythm: Normal rate.   Pulmonary:      Effort: Pulmonary effort is normal. No respiratory distress.      Breath sounds: Normal breath sounds. No stridor. No wheezing, rhonchi or rales.   Musculoskeletal:      Cervical back: Neck supple.   Skin:     General: Skin is warm and dry.      Findings: No rash.   Neurological:      Mental Status: He is alert and oriented to person, place, and time.      GCS: GCS eye subscore is 4. GCS verbal subscore is 5. GCS motor subscore is 6.   Psychiatric:         Speech: Speech normal.         Behavior: Behavior normal.         Thought Content: Thought content normal.         Judgment: Judgment normal.         Assessment/Plan:   1. Viral URI with cough  - POCT CoV-2, Flu A/B, RSV by PCR  - promethazine-dextromethorphan (PROMETHAZINE-DM) 6.25-15 MG/5ML syrup; Take 5 mL by mouth every 6 hours as needed for Cough for up to 7 days.  Dispense: 120 mL; Refill: 0  - benzonatate (TESSALON) 100 MG " Cap; Take 1 Capsule by mouth 3 times a day as needed for Cough.  Dispense: 30 Capsule; Refill: 0    2. Impacted cerumen of right ear  - carbamide peroxide (DEBROX) 6.5 % Solution; Administer 6 Drops into affected ear(s) 2 times a day. Administer drops in both ears.    Results for orders placed or performed in visit on 05/08/24   POCT CoV-2, Flu A/B, RSV by PCR   Result Value Ref Range    SARS-CoV-2 by PCR Negative Negative, Invalid    Influenza virus A RNA Negative Negative, Invalid    Influenza virus B, PCR Negative Negative, Invalid    RSV, PCR Negative Negative, Invalid   Symptomatic care.  -Oral hydration and rest.   -Cough control: nonpharmacologic options for cough relief such as throat lozenges, hot tea, honey.  -Over the counter expectorant as directed; Guaifenesin (Mucinex).  -Tylenol or ibuprofen for pain and fever as directed.   -Warm salt water gargles.  -OTC Throat lozenges or spray (Cepacol).    Seek emergency medical care immediately for: Trouble breathing, persistent pain or pressure in the chest, confusion, inability to wake or stay awake, bluish lips or face, persistent tachycardia (fast heart rate), prolonged dizziness, persistent high grade fevers. Follow up for prolonged cough, persistent wheezing, persistent throat pain, difficulty swallowing, persistent fevers, leg swelling, or any other concerns. Follow up with your Primary Care Provider.     -Discussed viral etiology with cough, symptomatic care, and S&S of PNA with follow up. Stable Vitals.    Differential diagnosis, natural history, supportive care, and indications for immediate follow-up discussed.

## 2024-05-29 ENCOUNTER — APPOINTMENT (OUTPATIENT)
Dept: RADIOLOGY | Facility: IMAGING CENTER | Age: 49
End: 2024-05-29
Payer: COMMERCIAL

## 2024-05-29 ENCOUNTER — NON-PROVIDER VISIT (OUTPATIENT)
Dept: URGENT CARE | Facility: PHYSICIAN GROUP | Age: 49
End: 2024-05-29
Payer: COMMERCIAL

## 2024-05-29 ENCOUNTER — OFFICE VISIT (OUTPATIENT)
Dept: URGENT CARE | Facility: CLINIC | Age: 49
End: 2024-05-29
Payer: COMMERCIAL

## 2024-05-29 VITALS
DIASTOLIC BLOOD PRESSURE: 80 MMHG | RESPIRATION RATE: 16 BRPM | SYSTOLIC BLOOD PRESSURE: 118 MMHG | WEIGHT: 151.01 LBS | OXYGEN SATURATION: 96 % | TEMPERATURE: 98.1 F | HEART RATE: 82 BPM | HEIGHT: 66 IN | BODY MASS INDEX: 24.27 KG/M2

## 2024-05-29 DIAGNOSIS — L03.116 CELLULITIS OF LEFT LOWER EXTREMITY: ICD-10-CM

## 2024-05-29 DIAGNOSIS — S81.802A WOUND OF LEFT LOWER EXTREMITY, INITIAL ENCOUNTER: ICD-10-CM

## 2024-05-29 PROCEDURE — 73590 X-RAY EXAM OF LOWER LEG: CPT | Mod: TC,FY,LT | Performed by: RADIOLOGY

## 2024-05-29 RX ORDER — DOXYCYCLINE 100 MG/1
100 CAPSULE ORAL 2 TIMES DAILY
Qty: 10 CAPSULE | Refills: 0 | Status: SHIPPED | OUTPATIENT
Start: 2024-05-29 | End: 2024-06-03

## 2024-05-29 RX ORDER — CEPHALEXIN 500 MG/1
500 CAPSULE ORAL 4 TIMES DAILY
Qty: 40 CAPSULE | Refills: 0 | Status: SHIPPED | OUTPATIENT
Start: 2024-05-29 | End: 2024-06-08

## 2024-05-29 RX ORDER — CEPHALEXIN 500 MG/1
500 CAPSULE ORAL 4 TIMES DAILY
Qty: 20 CAPSULE | Refills: 0 | Status: SHIPPED | OUTPATIENT
Start: 2024-05-29 | End: 2024-05-29

## 2024-05-29 ASSESSMENT — FIBROSIS 4 INDEX: FIB4 SCORE: 1.23

## 2024-05-29 NOTE — PROGRESS NOTES
"Subjective:   Cesar James is a very pleasant 48 y.o. male who presents for:    Chief Complaint   Patient presents with    Wound Check     Doi 05/25/2024  dropped a jet ski trailer on his shin        HPI:    Wound Infection  Episode onset: five days ago, the patient reports that he \"dropped a jet ski hitch from a height of three feet onto the anterior aspect of the left leg. Pertinent negatives include no chills or fever. Associated symptoms comments: Increased pain, redness, swelling around the site of injury. The back of the leg distal to the injury has been swelling and feeling like it \"stings\". No pain the leg, foot, or knee. [place] the wound. Treatments tried: he has been cleaning the wound daily with hydrogen peroxide, air dry, keeping the wound covered at work.     Last TDAP in 2021    ROS:    Review of Systems   Constitutional:  Negative for chills, fever and malaise/fatigue.   Skin:         Wound on left LE   All other systems reviewed and are negative.      Medications:      Current Outpatient Medications   Medication Sig    carbamide peroxide (DEBROX) 6.5 % Solution Administer 6 Drops into affected ear(s) 2 times a day. Administer drops in both ears.    benzonatate (TESSALON) 100 MG Cap Take 1 Capsule by mouth 3 times a day as needed for Cough.    SYMBICORT 80-4.5 MCG/ACT Aerosol 2 PUFF(S) INHALE TWICE DAILY. IN THE MORNING AND THE EVENING. RINSE MOUTH AND THROAT AFTER USE.       Allergies:     No Known Allergies    Problem List:     There is no problem list on file for this patient.      Surgical History:    No past surgical history on file.    Past Social Hx:     Social History     Socioeconomic History    Marital status:    Tobacco Use    Smoking status: Former     Current packs/day: 0.50     Average packs/day: 0.5 packs/day for 3.0 years (1.5 ttl pk-yrs)     Types: Cigarettes     Passive exposure: Past    Smokeless tobacco: Never "   Vaping Use    Vaping status: Some Days    Substances: THC    Devices: Disposable    Passive vaping exposure: Yes   Substance and Sexual Activity    Alcohol use: Not Currently    Drug use: Yes     Types: Marijuana, Inhaled    Sexual activity: Never        Past Family Hx:      Family History   Problem Relation Age of Onset    No Known Problems Mother        Problem list, medications, and allergies reviewed by myself today in Epic.     Objective:     Vitals:    05/29/24 1459   BP: 118/80   Pulse: 82   Resp: 16   Temp: 36.7 °C (98.1 °F)   SpO2: 96%       Physical Exam  Vitals reviewed.   Constitutional:       Appearance: Normal appearance. He is normal weight.   Skin:     General: Skin is warm.      Capillary Refill: Capillary refill takes 2 to 3 seconds.      Findings: Bruising and laceration present.      Comments: Superficial laceration present on the anterior aspect of the left lower extremity.  There is a skin flap located at the distal end of the wound.  No drainage from the wound, fluctuance.  Mild induration.  Swelling that extends from the anterior aspect of the right lower extremity to the posterior aspect of the right lower extremity.  No visible deformity.  The patient is able to bear weight on the leg without pain.  Nonantalgic gait.  Neurovascularly intact with a greater than 2-second capillary refill, 2+ pedal pulse.   Neurological:      Mental Status: He is alert.               X-ray images viewed and interpreted by APRN, confirmed by radiology:        RADIOLOGY RESULTS   DX-TIBIA AND FIBULA LEFT    Result Date: 5/29/2024 5/29/2024 4:13 PM HISTORY/REASON FOR EXAM:  Anterior left lower leg redness and wound after injury 5 days ago. TECHNIQUE/EXAM DESCRIPTION AND NUMBER OF VIEWS:  2 views of the LEFT tibia and fibula. COMPARISON: None FINDINGS: There is no evidence of acute fracture involving the tibia or fibula. There is no definite focal anterior soft tissue swelling. There is no soft tissue gas.  There is mild swelling along the lateral and posterior aspect of the left lower leg.     1.  There is no acute fracture or malalignment of the left tibia or fibula. 2.  There is distal soft tissue swelling with no soft tissue gas or radiopaque foreign body.             Assessment/Plan:     Diagnosis and associated orders:     1. Wound of left lower extremity, initial encounter  - doxycycline (MONODOX) 100 MG capsule; Take 1 Capsule by mouth 2 times a day for 5 days.  Dispense: 10 Capsule; Refill: 0  - cephALEXin (KEFLEX) 500 MG Cap; Take 1 Capsule by mouth 4 times a day for 10 days.  Dispense: 40 Capsule; Refill: 0  - DX-TIBIA AND FIBULA LEFT; Future    2. Cellulitis of left lower extremity  - doxycycline (MONODOX) 100 MG capsule; Take 1 Capsule by mouth 2 times a day for 5 days.  Dispense: 10 Capsule; Refill: 0  - cephALEXin (KEFLEX) 500 MG Cap; Take 1 Capsule by mouth 4 times a day for 10 days.  Dispense: 40 Capsule; Refill: 0  - DX-TIBIA AND FIBULA LEFT; Future           Comments/MDM:     X-ray negative for acute osseous abnormality to the tibia or fibula.  No gas pockets that may indicate the start of osteomyelitis.  Dual therapy with doxycycline and cephalexin sent to patient's preferred pharmacy for the treatment of cellulitis.  Dual therapy indicated as the patient reports that he [place] immediately after sustaining the laceration.  Denies contamination to the wound.  Laceration repair not indicated at this time as it has been greater than 48 hours.  Tdap up-to-date  The patient declined wound care in the clinic.  He was given comprehensive instruction on how to cleanse the wound with mild soap and water, apply Polysporin, top with Xeroform gauze, nonadhesive bandage, and wrapped with Coban.  Encouraged RICE therapy.  Oral analgesics as needed for pain and inflammation.  Return to clinic in 48 hours for wound recheck  Signs and symptoms of infection that would warrant immediate evaluation  reviewed with patient.  Patient verbalizes understanding and is in agreement with this plan of care.  Time I spent evaluating the patient in urgent care today was 32 minutes. This time includes preparing for visit, reviewing any pertinent notes or test results, counseling/education, exam, obtaining HPI, interpretation of lab tests, medication management and documentation as indicated above. Time does not include separately billable procedures noted.          All questions answered. Patient verbalized understanding and is in agreement with this plan of care.     If symptoms are worsening or not improving in 3-5 days, follow-up with PCP or return to UC. Differential diagnosis, natural history, and supportive care discussed. AVS handout given and reviewed with patient. Patient educated on red flags and when to seek treatment back in ED or UC.     I personally reviewed prior external notes and test results pertinent to today's visit.  I have independently reviewed and interpreted all diagnostics ordered during this urgent care visit.     This dictation has been created using voice recognition software. The accuracy of the dictation is limited by the abilities of the software. I expect there may be some errors of grammar and possibly content. I made every attempt to manually correct the errors within my dictation. However, errors related to voice recognition software may still exist and should be interpreted within the appropriate context.    This note was electronically signed by NISHA Woodward

## 2024-05-31 ASSESSMENT — ENCOUNTER SYMPTOMS
FEVER: 0
CHILLS: 0

## 2024-08-30 ENCOUNTER — HOSPITAL ENCOUNTER (OUTPATIENT)
Dept: LAB | Facility: MEDICAL CENTER | Age: 49
End: 2024-08-30
Attending: STUDENT IN AN ORGANIZED HEALTH CARE EDUCATION/TRAINING PROGRAM
Payer: COMMERCIAL

## 2024-08-30 PROCEDURE — 83013 H PYLORI (C-13) BREATH: CPT

## 2024-08-31 LAB — UREA BREATH TEST QL: POSITIVE

## 2024-11-20 ENCOUNTER — OCCUPATIONAL MEDICINE (OUTPATIENT)
Dept: URGENT CARE | Facility: PHYSICIAN GROUP | Age: 49
End: 2024-11-20
Payer: COMMERCIAL

## 2024-11-20 VITALS
HEART RATE: 98 BPM | DIASTOLIC BLOOD PRESSURE: 82 MMHG | SYSTOLIC BLOOD PRESSURE: 124 MMHG | HEIGHT: 66 IN | BODY MASS INDEX: 23.14 KG/M2 | TEMPERATURE: 98.1 F | OXYGEN SATURATION: 98 % | WEIGHT: 144 LBS | RESPIRATION RATE: 16 BRPM

## 2024-11-20 DIAGNOSIS — S86.912A KNEE STRAIN, LEFT, INITIAL ENCOUNTER: ICD-10-CM

## 2024-11-20 PROCEDURE — 3079F DIAST BP 80-89 MM HG: CPT | Performed by: REGISTERED NURSE

## 2024-11-20 PROCEDURE — 99213 OFFICE O/P EST LOW 20 MIN: CPT | Performed by: REGISTERED NURSE

## 2024-11-20 PROCEDURE — 3074F SYST BP LT 130 MM HG: CPT | Performed by: REGISTERED NURSE

## 2024-11-20 RX ORDER — NAPROXEN 500 MG/1
500 TABLET ORAL 2 TIMES DAILY WITH MEALS
Qty: 14 TABLET | Refills: 0 | Status: SHIPPED | OUTPATIENT
Start: 2024-11-20 | End: 2024-11-27

## 2024-11-20 RX ORDER — METRONIDAZOLE 500 MG/1
TABLET ORAL
COMMUNITY
Start: 2024-09-23 | End: 2024-11-20

## 2024-11-20 RX ORDER — IBUPROFEN 600 MG/1
TABLET, FILM COATED ORAL
COMMUNITY
Start: 2024-10-02 | End: 2024-11-20

## 2024-11-20 RX ORDER — CLARITHROMYCIN 500 MG/1
TABLET ORAL
COMMUNITY
Start: 2024-09-23 | End: 2024-11-20

## 2024-11-20 ASSESSMENT — FIBROSIS 4 INDEX: FIB4 SCORE: 1.26

## 2024-11-20 NOTE — LETTER
"    EMPLOYEE’S CLAIM FOR COMPENSATION/ REPORT OF INITIAL TREATMENT  FORM C-4  PLEASE TYPE OR PRINT    EMPLOYEE’S CLAIM - PROVIDE ALL INFORMATION REQUESTED   First Name                    JEREMY Guerrero                  Last Name  Jacob Birthdate                    1975                Sex  Male Claim Number (Insurer’s Use Only)     Home Address  [address] Age  49 y.o. Height  1.676 m (5' 6\") Weight  65.3 kg (144 lb) Social Security Number     [facility] [place]  [address] Telephone  [phone] (home)    Mailing Address  [address] [facility] [place]  [address] Primary Language Spoken  English    INSURER   THIRD[employer]   [employer]   Employee's Occupation (Job Title) When Injury or Occupational Disease Occurred      Employer's Name/Company Name  [employer]  Telephone  [phone]    Office Mail Address (Number and Street)  [address]     Date of Injury (if applicable) 11/20/2024               Hours Injury (if applicable)  2:30 AM Date Employer Notified  11/20/2024 Last Day of Work after Injury or Occupational Disease  11/20/2024 Supervisor to Whom Injury Reported  Archie Murray   Address or Location of Accident (if applicable)  Work [1]   What were you doing at the time of accident? (if applicable)  walking    How did this injury or occupational disease occur? (Be specific and answer in detail. Use additional sheet if necessary)  finished lockout turned to walk away and my knee popped   If you believe that you have an occupational disease, when did you first have knowledge of the disability and its relationship to your employment?  N/a Witnesses to the Accident (if applicable)  N/a      Nature of Injury or Occupational Disease  Workers' Compensation  Part(s) of Body Injured or Affected  Knee (L) N/A N/A    I CERTIFY THAT THE ABOVE IS TRUE AND CORRECT TO T " "Subjective:      Tony Gordillo is a 42 y.o. male who was referred by Dr. Ford for evaluation of his hypogonadism.    The patient began testosterone cypionate injections (200 mg every 2 weeks) for his hypogonadism about 6 months ago. Symptoms before beginning treatment included low energy and low libido. He reports significant improvement in his symptoms with the injections. Levels improved from 168/193 to 575 (mid cycle check). Denies adverse SE. His wife gives the injections, which she "does not like doing." Feels the medication wearing off at the end of the cycle. Denies a family history of prostate cancer.    He continues to experience ED that began about 1 year ago. No improvement with testosterone.     The following portions of the patient's history were reviewed and updated as appropriate: allergies, current medications, past family history, past medical history, past social history, past surgical history and problem list.    Review of Systems  Constitutional: no fever or chills  ENT: no nasal congestion or sore throat  Respiratory: no cough or shortness of breath  Cardiovascular: no chest pain or palpitations  Gastrointestinal: no nausea or vomiting, tolerating diet  Genitourinary: as per HPI  Hematologic/Lymphatic: no easy bruising or lymphadenopathy  Musculoskeletal: no arthralgias or myalgias  Neurological: no seizures or tremors  Behavioral/Psych: no auditory or visual hallucinations     Objective:   Vitals: BP (!) 163/98 (BP Location: Left arm, Patient Position: Sitting, BP Method: Large (Automatic))   Pulse 98   Ht 6' 1" (1.854 m)   Wt (!) 147 kg (324 lb)   BMI 42.75 kg/m²     Physical Exam   General: alert and oriented, no acute distress  Head: normocephalic, atraumatic  Neck: supple, no lymphadenopathy, normal ROM, no masses  Respiratory: Symmetric expansion, non-labored breathing  Cardiovascular: regular rate and rhythm, nomal pulses, no peripheral edema  Abdomen: soft, non tender, non " HE BEST OF MY KNOWLEDGE AND THAT I HAVE PROVIDED THIS INFORMATION IN ORDER TO OBTAIN THE BENEFITS OF [place] AND [place]CCUP[place]EAS[place]S [place], OR [place]).  I HEREBY AUTHORIZE ANY PHYSICIAN, CHIROPRACTOR, SURGEON, PRACTITIONER OR ANY OTHER PERSON, ANY HOSPITAL, INCLUDING [facility] OR [facility], ANY  MEDICAL SERVICE ORGANIZATION, ANY INSURANCE COMPANY, OR OTHER INSTITUTION OR ORGANIZATION TO RELEASE TO EACH OTHER, ANY MEDICAL OR OTHER INFORMATION, INCLUDING BENEFITS PAID OR PAYABLE, PERTINENT TO THIS INJURY OR DISEASE, EXCEPT INFORMATION RELATIVE TO DIAGNOSIS, TREATMENT AND/OR COUNSELING FOR AIDS, PSYCHOLOGICAL CONDITIONS, ALCOHOL OR CONTROLLED SUBSTANCES, FOR WHICH I MUST GIVE SPECIFIC AUTHORIZATION.  A PHOTOSTAT OF THIS AUTHORIZATION SHALL BE VALID AS THE ORIGINAL.     Date 11/20/2024   [facility] Employee’s Original or  *Electronic Signature   THIS REPORT MUST BE COMPLETED AND MAILED WITHIN 3 WORKING DAYS OF TREATMENT   [facility]    Name of Facility  [facility]   Date 11/20/2024 Diagnosis and Description of Injury or Occupational Disease  (S86.912A) Knee strain, left, initial encounter  The encounter diagnosis was Knee strain, left, initial encounter. Is there evidence that the injured employee was under the influence of alcohol and/or another controlled substance at the time of accident?  [x]No  [] Yes (if yes, please explain)   Hour 9:44 AM   NO   Treatment: RICE therapy.  NSAID with PPI.  Gentle ROM.      Have you advised the patient to remain off work five days or more?   [] Yes Indicate dates: From   To    [x] No      If no, is the injured employee capable of: [] full duty [x] modified duty                     If modified duty, specify any limitations / restrictions:                                                                                                                                 distended, no palpable masses, no hernias, no hepatomegaly or splenomegaly  Genitourinary: deferred  Lymphatic: no inguinal nodes  Skin: normal coloration and turgor, no rashes, no suspicious skin lesions noted  Neuro: alert and oriented x3, no gross deficits  Psych: normal judgment and insight, normal mood/affect and non-anxious    Lab Review   Urinalysis demonstrates : no specimen   Lab Results   Component Value Date    WBC 9.83 05/18/2020    HGB 17.0 05/18/2020    HCT 54.8 (H) 05/18/2020    MCV 94 05/18/2020     05/18/2020     Lab Results   Component Value Date    CREATININE 0.8 05/18/2020    BUN 14 05/18/2020     Lab Results   Component Value Date    PSA 0.64 05/18/2020     Imaging   None    Assessment:     1. Hypogonadism in male    2. Erectile dysfunction due to arterial insufficiency      Plan:   Tony was seen today for establish care and erectile dysfunction.    Diagnoses and all orders for this visit:    Hypogonadism in male  -     CBC auto differential; Future  -     Testosterone; Future  -     ESTROGENS, TOTAL; Future  -     testosterone Pllt 10 Pellet    Erectile dysfunction due to arterial insufficiency  -     tadalafiL (CIALIS) 20 MG Tab; Take 1 tablet (20 mg total) by mouth every 72 hours as needed.      Plan:  --Trough testosterone and repeat CBC next week (hematocrit was slightly elevated at last check)  --Discussed all options for treating hypogonadism.   --Pt would like to switch to testostopel   --Trial of cialis   --Follow up with Dr. Crystal for testopel                                                                                                                                                                                                                                                                                        X-Ray Findings:      From information given by the employee, together with medical evidence, can you directly connect this injury or occupational disease as job incurred?  [x]Yes   [] No Yes    Is additional medical care by a physician indicated? [x]Yes [] No  Yes    Do you know of any previous injury or disease contributing to this condition or occupational disease? []Yes [x] No (Explain if yes)                          No   Date  11/20/2024 Print Health Care Provider’s Name  NISHA Angela I certify that the employer’s copy of  this form was delivered to the employer on:   Address  [address] [facility] [address] INSURER'S USE ONLY                       [facility] Zip  [address] Provider’s Tax ID Number  [id number]   Telephone  Dept: [phone]    Health Care Provider’s Original or Electronic Signature  e-ARABELLA Duron Degree (MD,DO, DC,PA-C,APRN)  APRN  Choose (if applicable)      ORIGINAL - TREATING HEALTHCARE PROVIDER PAGE 2 - INSURER/TPA PAGE 3 - EMPLOYER PAGE 4 - EMPLOYEE             Form C-4 (rev.08/23)

## 2024-11-20 NOTE — LETTER
PHYSICIAN’S AND CHIROPRACTIC PHYSICIAN'S   PROGRESS REPORT   CERTIFICATION OF DISABILITY Claim Number:     Social Security Number:    Patient’s Name: JEANINE GARRISON Date of Injury: 11/20/2024   Employer: [employer] Name of MCO (if applicable):      Patient’s Job Description/Occupation:        Previous Injuries/Diseases/Surgeries Contributing to the Condition:  no      Diagnosis: (S82.282F) Knee strain, left, initial encounter      Related to the Industrial Injury? Yes     Explain:        Objective Medical Findings: Antalgic gait.  Left knee: Normal active and passive range of motion.  No swelling or deformity.  No crepitus.  No laxity.  Does have some tenderness over the MCL.         None - Discharged                         Stable  Yes                 Ratable  Yes        Generally Improved                         Condition Worsened                  Condition Same  May Have Suffered a Permanent Disability No     Treatment Plan:    NSAD with PPI, RICE therapy, Gentle ROM         No Change in Therapy                  PT/OT Prescribed                      Medication May be Used While Working        Case Management                          PT/OT Discontinued    Consultation    Further Diagnostic Studies:    Prescription(s)           Naproxen with Omeprazole     Released to FULL DUTY /No Restrictions on (Date):       Certified TOTALLY TEMPORARILY DISABLED (Indicate Dates) From:   To:    X  Released to RESTRICTED/Modified Duty on (Date): From: 11/20/2024 To: 11/23/2024  Restrictions Are:  Temporary      No Sitting    No Standing    No Pulling Other:         No Bending at Waist X    No Stooping     No Lifting        No Carrying     No Walking Lifting Restricted to (lbs.):          No Pushing     X   No Climbing     No Reaching Above Shoulders       Date of Next Visit:  11/23/2024 Date of this Exam: 11/20/2024 Physician/Chiropractic Physician Name: NISHA Angela  Physician/Chiropractic Physician Signature:  Javier Martines DO MPH     [facility]:  [address] [place] [address], [address] [place], [place] [address] - Telephone [phone] [facility]:  [address] [place] [address], [address] [place], [place] [address] - Telephone [phone]    [email or web address]  D-39 (Rev. 10/24)

## 2024-11-20 NOTE — PROGRESS NOTES
"Subjective:     Cesar James is a 49 y.o. male who presents for Work-Related Injury ((L) knee, pt states he turned to walk, felt a pop and now states it's hard to walk)    DOI: 11/20/24  Past injury that could be related: No prior knee injuries    CLAUDIA: Took a step and felt a pop on the right medial knee, had to hobble/limp, unable to put full pressure on left leg. No numbness or tingling currently. Fairly normal active ROM. Took motrin.         PMH:   No pertinent past medical history to this problem  MEDS:  Medications were reviewed in EMR  ALLERGIES:  Allergies were reviewed in EMR  SOCHX:  Works as a   FH:   No pertinent family history to this problem       Objective:     /82   Pulse 98   Temp 36.7 °C (98.1 °F) (Temporal)   Resp 16   Ht 1.676 m (5' 6\")   Wt 65.3 kg (144 lb)   SpO2 98%   BMI 23.24 kg/m²     Antalgic gait.  Left knee: Normal active and passive range of motion.  No swelling or deformity.  No crepitus.  No laxity.  Does have some tenderness over the MCL.    Assessment/Plan:       1. Knee strain, left, initial encounter  - naproxen (NAPROSYN) 500 MG Tab; Take 1 Tablet by mouth 2 times a day with meals for 7 days.  Dispense: 14 Tablet; Refill: 0  - omeprazole (PRILOSEC) 20 MG delayed-release capsule; Take 1 Capsule by mouth every day for 7 days.  Dispense: 7 Capsule; Refill: 0    Follow up in 3 days   RICE therapy  NSAID with PPI  Gentle ROM    Differential diagnosis, natural history, supportive care, and indications for immediate follow-up discussed.    "

## 2024-11-20 NOTE — LETTER
"    EMPLOYEE’S CLAIM FOR COMPENSATION/ REPORT OF INITIAL TREATMENT  FORM C-4  PLEASE TYPE OR PRINT    EMPLOYEE’S CLAIM - PROVIDE ALL INFORMATION REQUESTED   First Name                    JEREMY Guerrero                  Last Name  Jacob Birthdate                    1975                Sex  Male Claim Number (Insurer’s Use Only)     Home Address  [address] Age  49 y.o. Height  1.676 m (5' 6\") Weight  65.3 kg (144 lb) Social Security Number     [facility] [place]  [address] Telephone  [phone] (home)    Mailing Address  [address] [facility] Zip  [address] Primary Language Spoken  English    INSURER   THIRD-PARTY   [employer]   Employee's Occupation (Job Title) When Injury or Occupational Disease Occurred      Employer's Name/Company Name  [employer]  Telephone  [phone]    Office Mail Address (Number and Street)  [address]     Date of Injury (if applicable) 11/20/2024               Hours Injury (if applicable)  2:30 AM Date Employer Notified  11/20/2024 Last Day of Work after Injury or Occupational Disease  11/20/2024 Supervisor to Whom Injury Reported  Archie Murray   Address or Location of Accident (if applicable)  Work [1]   What were you doing at the time of accident? (if applicable)  walking    How did this injury or occupational disease occur? (Be specific and answer in detail. Use additional sheet if necessary)  finished lockout turned to walk away and my knee popped   If you believe that you have an occupational disease, when did you first have knowledge of the disability and its relationship to your employment?  N/a Witnesses to the Accident (if applicable)  N/a      Nature of Injury or Occupational Disease  Workers' Compensation  Part(s) of Body Injured or Affected  Knee (L) N/A N/A    I CERTIFY THAT THE ABOVE IS TRUE AND CORRECT TO T " HE BEST OF MY KNOWLEDGE AND THAT I HAVE PROVIDED THIS INFORMATION IN ORDER TO OBTAIN THE BENEFITS OF [place]CC[place], OR CHAPTER [place]).  I HEREBY AUTHORIZE ANY PHYSICIAN, CHIROPRACTOR, SURGEON, PRACTITIONER OR ANY OTHER PERSON, ANY HOSPITAL, INCLUDING [facility] OR [facility], ANY  MEDICAL SERVICE ORGANIZATION, ANY INSURANCE COMPANY, OR OTHER INSTITUTION OR ORGANIZATION TO RELEASE TO EACH OTHER, ANY MEDICAL OR OTHER INFORMATION, INCLUDING BENEFITS PAID OR PAYABLE, PERTINENT TO THIS INJURY OR DISEASE, EXCEPT INFORMATION RELATIVE TO DIAGNOSIS, TREATMENT AND/OR COUNSELING FOR AIDS, PSYCHOLOGICAL CONDITIONS, ALCOHOL OR CONTROLLED SUBSTANCES, FOR WHICH I MUST GIVE SPECIFIC AUTHORIZATION.  A PHOTOSTAT OF THIS AUTHORIZATION SHALL BE VALID AS THE ORIGINAL.     Date 11/20/2024   [facility] Employee’s Original or  *Electronic Signature   THIS REPORT MUST BE COMPLETED AND MAILED WITHIN 3 WORKING DAYS OF TREATMENT   [facility]    Name of Facility  [facility]   Date 11/20/2024 Diagnosis and Description of Injury or Occupational Disease  (S86.912A) Knee strain, left, initial encounter  The encounter diagnosis was Knee strain, left, initial encounter. Is there evidence that the injured employee was under the influence of alcohol and/or another controlled substance at the time of accident?  [x]No  [] Yes (if yes, please explain)   Hour 9:44 AM  No   Treatment: RICE therapy.  NSAID with PPI.  Gentle ROM.      Have you advised the patient to remain off work five days or more?   [] Yes Indicate dates: From   To    [x] No      If no, is the injured employee capable of: [] full duty [x] modified duty                     If modified duty, specify any limitations / restrictions:                                                                                                                                                                                                                                                                                                                                                                                                                   X-Ray Findings:      From information given by the employee, together with medical evidence, can you directly connect this injury or occupational disease as job incurred?  [x]Yes   [] No Yes    Is additional medical care by a physician indicated? [x]Yes [] No  Yes    Do you know of any previous injury or disease contributing to this condition or occupational disease? []Yes [x] No (Explain if yes)                          No   Date  11/20/2024 Print Health Care Provider’s Name  NISHA Angela I certify that the employer’s copy of  this form was delivered to the employer on:   Address  [address] [facility] INSURER'S USE ONLY                       [facility]  [address] Provider’s Tax ID Number  [id number]   Telephone  Dept: [phone]    Health Care Provider’s Original or Electronic Signature  e-ARABELLA Duron Degree (MD,DO, DC,PA-C,APRN)  [email or web address]   Choose (if applicable)      ORIGINAL - TREATING HEALTHCARE PROVIDER PAGE 2 - INSURER/TPA PAGE 3 - EMPLOYER PAGE 4 - EMPLOYEE             Form C-4 (rev.08/23)

## 2024-11-23 ENCOUNTER — OCCUPATIONAL MEDICINE (OUTPATIENT)
Dept: URGENT CARE | Facility: PHYSICIAN GROUP | Age: 49
End: 2024-11-23
Payer: COMMERCIAL

## 2024-11-23 VITALS
TEMPERATURE: 98.2 F | RESPIRATION RATE: 14 BRPM | HEART RATE: 70 BPM | SYSTOLIC BLOOD PRESSURE: 108 MMHG | HEIGHT: 66 IN | DIASTOLIC BLOOD PRESSURE: 64 MMHG | WEIGHT: 150 LBS | BODY MASS INDEX: 24.11 KG/M2 | OXYGEN SATURATION: 98 %

## 2024-11-23 DIAGNOSIS — S86.912D KNEE STRAIN, LEFT, SUBSEQUENT ENCOUNTER: ICD-10-CM

## 2024-11-23 PROCEDURE — 99213 OFFICE O/P EST LOW 20 MIN: CPT | Performed by: PHYSICIAN ASSISTANT

## 2024-11-23 PROCEDURE — 3078F DIAST BP <80 MM HG: CPT | Performed by: PHYSICIAN ASSISTANT

## 2024-11-23 PROCEDURE — 3074F SYST BP LT 130 MM HG: CPT | Performed by: PHYSICIAN ASSISTANT

## 2024-11-23 PROCEDURE — 1125F AMNT PAIN NOTED PAIN PRSNT: CPT | Performed by: PHYSICIAN ASSISTANT

## 2024-11-23 RX ORDER — CHLORHEXIDINE GLUCONATE ORAL RINSE 1.2 MG/ML
SOLUTION DENTAL
COMMUNITY
Start: 2024-11-19

## 2024-11-23 RX ORDER — AMOXICILLIN 500 MG/1
500 CAPSULE ORAL 3 TIMES DAILY
COMMUNITY
Start: 2024-11-19

## 2024-11-23 RX ORDER — HYDROCODONE BITARTRATE AND ACETAMINOPHEN 5; 325 MG/1; MG/1
1 TABLET ORAL EVERY 4 HOURS PRN
COMMUNITY
Start: 2024-11-19

## 2024-11-23 ASSESSMENT — FIBROSIS 4 INDEX: FIB4 SCORE: 1.26

## 2024-11-23 ASSESSMENT — PAIN SCALES - GENERAL: PAINLEVEL_OUTOF10: 5=MODERATE PAIN

## 2024-11-23 NOTE — LETTER
PHYSICIAN’S AND CHIROPRACTIC PHYSICIAN'S   PROGRESS REPORT   CERTIFICATION OF DISABILITY Claim Number:     Social Security Number:    Patient’s Name: JEANINE GARRISON Date of Injury: 11/20/2024   Employer: [employer] Name of MCO (if applicable):      Patient’s Job Description/Occupation:        Previous Injuries/Diseases/Surgeries Contributing to the Condition:  No      Diagnosis: (K45.108K) Knee strain, left, subsequent encounter      Related to the Industrial Injury? Yes     Explain:        Objective Medical Findings: STS and TTP medial left knee. Pain with weightbearing.         None - Discharged                         Stable  No                 Ratable  No     X   Generally Improved                         Condition Worsened                  Condition Same  May Have Suffered a Permanent Disability No     Treatment Plan:              No Change in Therapy                  PT/OT Prescribed                      Medication May be Used While Working        Case Management                          PT/OT Discontinued    Consultation    Further Diagnostic Studies:    Prescription(s)                 Released to FULL DUTY /No Restrictions on (Date):       Certified TOTALLY TEMPORARILY DISABLED (Indicate Dates) From:   To:    X  Released to RESTRICTED/Modified Duty on (Date): From: 11/23/2024 To: 11/27/2024  Restrictions Are:  Temporary      No Sitting    No Standing    No Pulling Other: Light duty recommended  No squatting or kneeling  Walking no more than 4-6 hours, 15min break every hour       No Bending at Waist     No Stooping     No Lifting        No Carrying     No Walking Lifting Restricted to (lbs.):  < or = to 25 pounds       No Pushing     X   No Climbing     No Reaching Above Shoulders       Date of Next Visit:  11/27/2024 Date of this Exam: 11/23/2024 Physician/Chiropractic Physician Name: Juvencio Carrion PAzaliaAPINO Physician/Chiropractic Physician Signature:  Javier Martines DO  MPH     [place]:  [address]  [facility], [address] [place], [place] [address] - Telephone [phone] [place]:  [address]  [facility], [address] [place], [place] [address] - Telephone [phone]    [email or web address]  D-39 (Rev. 10/24)

## 2024-11-23 NOTE — PROGRESS NOTES
"Subjective     Cesar James is a 49 y.o. male who presents with Other (WC follow up DOI 11/20/24 L knee injury)            HPI  Interval improvement noted.  Still having pain and swelling on the medial left knee.  Pain with weightbearing squatting and bending.  Has not been back to work.  Using OTC meds and conservative measures with good relief.    ROS           Objective     /64   Pulse 70   Temp 36.8 °C (98.2 °F) (Temporal)   Resp 14   Ht 1.676 m (5' 6\")   Wt 68 kg (150 lb)   SpO2 98%   BMI 24.21 kg/m²      Physical Exam    STS and TTP medial left knee. Pain with weightbearing.                   Assessment & Plan        Assessment & Plan  Knee strain, left, subsequent encounter  Interval improvement noted.  Work restrictions adjusted.  Follow-up after the weekend      Please note that this dictation was created using voice recognition software. I have made every reasonable attempt to correct obvious errors, but I expect that there are errors of grammar and possibly content that I did not discover before finalizing the note.                       "

## 2024-11-26 ENCOUNTER — HOSPITAL ENCOUNTER (OUTPATIENT)
Dept: LAB | Facility: MEDICAL CENTER | Age: 49
End: 2024-11-26
Payer: COMMERCIAL

## 2024-11-26 LAB
ALBUMIN SERPL BCP-MCNC: 4.4 G/DL (ref 3.2–4.9)
ALBUMIN/GLOB SERPL: 1.7 G/DL
ALP SERPL-CCNC: 64 U/L (ref 30–99)
ALT SERPL-CCNC: 41 U/L (ref 2–50)
ANION GAP SERPL CALC-SCNC: 10 MMOL/L (ref 7–16)
AST SERPL-CCNC: 31 U/L (ref 12–45)
BASOPHILS # BLD AUTO: 0.6 % (ref 0–1.8)
BASOPHILS # BLD: 0.06 K/UL (ref 0–0.12)
BILIRUB SERPL-MCNC: 0.7 MG/DL (ref 0.1–1.5)
BUN SERPL-MCNC: 11 MG/DL (ref 8–22)
CALCIUM ALBUM COR SERPL-MCNC: 8.8 MG/DL (ref 8.5–10.5)
CALCIUM SERPL-MCNC: 9.1 MG/DL (ref 8.5–10.5)
CHLORIDE SERPL-SCNC: 100 MMOL/L (ref 96–112)
CO2 SERPL-SCNC: 28 MMOL/L (ref 20–33)
CREAT SERPL-MCNC: 0.9 MG/DL (ref 0.5–1.4)
EOSINOPHIL # BLD AUTO: 0.24 K/UL (ref 0–0.51)
EOSINOPHIL NFR BLD: 2.3 % (ref 0–6.9)
ERYTHROCYTE [DISTWIDTH] IN BLOOD BY AUTOMATED COUNT: 39.5 FL (ref 35.9–50)
FASTING STATUS PATIENT QL REPORTED: NORMAL
GFR SERPLBLD CREATININE-BSD FMLA CKD-EPI: 106 ML/MIN/1.73 M 2
GLOBULIN SER CALC-MCNC: 2.6 G/DL (ref 1.9–3.5)
GLUCOSE SERPL-MCNC: 97 MG/DL (ref 65–99)
HCT VFR BLD AUTO: 49.2 % (ref 42–52)
HCV AB SER QL: REACTIVE
HGB BLD-MCNC: 17.2 G/DL (ref 14–18)
IMM GRANULOCYTES # BLD AUTO: 0.02 K/UL (ref 0–0.11)
IMM GRANULOCYTES NFR BLD AUTO: 0.2 % (ref 0–0.9)
INR PPP: 1.09 (ref 0.87–1.13)
LYMPHOCYTES # BLD AUTO: 3.46 K/UL (ref 1–4.8)
LYMPHOCYTES NFR BLD: 33.8 % (ref 22–41)
MCH RBC QN AUTO: 31.4 PG (ref 27–33)
MCHC RBC AUTO-ENTMCNC: 35 G/DL (ref 32.3–36.5)
MCV RBC AUTO: 89.8 FL (ref 81.4–97.8)
MONOCYTES # BLD AUTO: 0.68 K/UL (ref 0–0.85)
MONOCYTES NFR BLD AUTO: 6.6 % (ref 0–13.4)
NEUTROPHILS # BLD AUTO: 5.77 K/UL (ref 1.82–7.42)
NEUTROPHILS NFR BLD: 56.5 % (ref 44–72)
NRBC # BLD AUTO: 0 K/UL
NRBC BLD-RTO: 0 /100 WBC (ref 0–0.2)
PLATELET # BLD AUTO: 252 K/UL (ref 164–446)
PMV BLD AUTO: 10.3 FL (ref 9–12.9)
POTASSIUM SERPL-SCNC: 3.7 MMOL/L (ref 3.6–5.5)
PROT SERPL-MCNC: 7 G/DL (ref 6–8.2)
PROTHROMBIN TIME: 14.1 SEC (ref 12–14.6)
RBC # BLD AUTO: 5.48 M/UL (ref 4.7–6.1)
SODIUM SERPL-SCNC: 138 MMOL/L (ref 135–145)
UREA BREATH TEST QL: NEGATIVE
WBC # BLD AUTO: 10.2 K/UL (ref 4.8–10.8)

## 2024-11-26 PROCEDURE — 85610 PROTHROMBIN TIME: CPT

## 2024-11-26 PROCEDURE — 87522 HEPATITIS C REVRS TRNSCRPJ: CPT

## 2024-11-26 PROCEDURE — 36415 COLL VENOUS BLD VENIPUNCTURE: CPT

## 2024-11-26 PROCEDURE — 83013 H PYLORI (C-13) BREATH: CPT

## 2024-11-26 PROCEDURE — 86803 HEPATITIS C AB TEST: CPT

## 2024-11-26 PROCEDURE — 80053 COMPREHEN METABOLIC PANEL: CPT

## 2024-11-26 PROCEDURE — 85025 COMPLETE CBC W/AUTO DIFF WBC: CPT

## 2024-11-27 ENCOUNTER — OCCUPATIONAL MEDICINE (OUTPATIENT)
Dept: URGENT CARE | Facility: PHYSICIAN GROUP | Age: 49
End: 2024-11-27
Payer: COMMERCIAL

## 2024-11-27 VITALS
HEIGHT: 66 IN | WEIGHT: 147.2 LBS | TEMPERATURE: 98.5 F | SYSTOLIC BLOOD PRESSURE: 102 MMHG | HEART RATE: 81 BPM | DIASTOLIC BLOOD PRESSURE: 60 MMHG | OXYGEN SATURATION: 97 % | BODY MASS INDEX: 23.66 KG/M2 | RESPIRATION RATE: 20 BRPM

## 2024-11-27 DIAGNOSIS — S86.912D KNEE STRAIN, LEFT, SUBSEQUENT ENCOUNTER: ICD-10-CM

## 2024-11-27 PROCEDURE — 3078F DIAST BP <80 MM HG: CPT | Performed by: FAMILY MEDICINE

## 2024-11-27 PROCEDURE — 3074F SYST BP LT 130 MM HG: CPT | Performed by: FAMILY MEDICINE

## 2024-11-27 PROCEDURE — 99213 OFFICE O/P EST LOW 20 MIN: CPT | Performed by: FAMILY MEDICINE

## 2024-11-27 ASSESSMENT — FIBROSIS 4 INDEX: FIB4 SCORE: 0.9

## 2024-11-27 NOTE — LETTER
PHYSICIAN’S AND CHIROPRACTIC PHYSICIAN'S   PROGRESS REPORT   CERTIFICATION OF DISABILITY Claim Number:     Social Security Number:    Patient’s Name: JEANINE GARRISON Date of Injury: 11/20/2024   Employer: [employer] Name of MCO (if applicable):      Patient’s Job Description/Occupation:        Previous Injuries/Diseases/Surgeries Contributing to the Condition:  no      Diagnosis: (S86.915N) Knee strain, left, subsequent encounter      Related to the Industrial Injury? Yes     Explain: DOI:   11/20      F/u on left knee strain      States pain improved.         Objective Medical Findings:  left Knee -  no TTP.   There is no swelling.   There is no erythema.  There is no crepitus.  Varus and valgus stress tests negative. Lachman, anterior/posterior drawer test negative  Strength: Flexion 5/5, extension 5/5          None - Discharged                         Stable  No                 Ratable  No     X   Generally Improved                         Condition Worsened                  Condition Same  May Have Suffered a Permanent Disability No     Treatment Plan:    Knee strain, left, subsequent encounter    Improved  Trial full duty  F/u one wk         No Change in Therapy                  PT/OT Prescribed                      Medication May be Used While Working        Case Management                          PT/OT Discontinued    Consultation    Further Diagnostic Studies:    Prescription(s)               X  Released to FULL DUTY /No Restrictions on (Date):       Certified TOTALLY TEMPORARILY DISABLED (Indicate Dates) From:   To:      Released to RESTRICTED/Modified Duty on (Date): From:   To:    Restrictions Are:         No Sitting    No Standing    No Pulling Other:         No Bending at Waist     No Stooping     No Lifting        No Carrying     No Walking Lifting Restricted to (lbs.):          No Pushing        No Climbing     No Reaching Above Shoulders       Date of Next  Visit:  12/4/2024 Date of this Exam: 11/27/2024 Physician/Chiropractic Physician Name: John Arita M.D. Physician/Chiropractic Physician Signature:  Javier Martines DO MPH     [place]:  [facility]  [facility], [address] [place], [place] [address] - Telephone [phone] [place]:  [address] [place] [address], [address] [place], [place] [address] - Telephone [phone]    [email or web address]  D-39 (Rev. 10/24)

## 2024-11-28 LAB
HCV RNA SERPL NAA+PROBE-ACNC: ABNORMAL IU/ML
HCV RNA SERPL NAA+PROBE-LOG IU: 6.88 LOG IU/ML
HCV RNA SERPL QL NAA+PROBE: DETECTED

## 2024-11-28 NOTE — PROGRESS NOTES
Subjective:      Chief Complaint   Patient presents with    Work-Related Injury     Left knee - pt sts its still sore but he has been able to walk and bear weight on it                Knee Pain       DOI:   11/20      F/u on left knee strain      States pain improved.       Complains of left knee pain for 3 wks.  Denies any precipitating injury or accident.  Describes pain as dull and achy.  Pain does not radiate.  Pain is somewhat better with motrin.    S/p total left knee replacement 10 yrs ago             Social History     Tobacco Use    Smoking status: Former     Current packs/day: 0.50     Average packs/day: 0.5 packs/day for 3.0 years (1.5 ttl pk-yrs)     Types: Cigarettes     Passive exposure: Past    Smokeless tobacco: Never   Vaping Use    Vaping status: Never Used    Passive vaping exposure: Yes   Substance Use Topics    Alcohol use: Not Currently    Drug use: Yes     Types: Marijuana, Inhaled         No past medical history on file.      Current Outpatient Medications on File Prior to Visit   Medication Sig Dispense Refill    chlorhexidine (PERIDEX) 0.12 % Solution SWISH AND SPIT 15 ML TWICE A DAY FOR 30 SECONDS      amoxicillin (AMOXIL) 500 MG Cap Take 500 mg by mouth 3 times a day.      HYDROcodone-acetaminophen (NORCO) 5-325 MG Tab per tablet Take 1 Tablet by mouth every four hours as needed.      naproxen (NAPROSYN) 500 MG Tab Take 1 Tablet by mouth 2 times a day with meals for 7 days. (Patient not taking: Reported on 11/27/2024) 14 Tablet 0    omeprazole (PRILOSEC) 20 MG delayed-release capsule Take 1 Capsule by mouth every day for 7 days. 7 Capsule 0     No current facility-administered medications on file prior to visit.               Review of Systems   Constitutional: Negative for fever and malaise/fatigue.   Respiratory: Negative for shortness of breath.    Cardiovascular: Negative for chest pain.   Neurological: Negative for tingling.   All other systems reviewed and are negative.          "Objective:     /60   Pulse 81   Temp 36.9 °C (98.5 °F) (Temporal)   Resp 20   Ht 1.676 m (5' 6\")   Wt 66.8 kg (147 lb 3.2 oz)   SpO2 97%     Physical Exam   Constitutional: patient is oriented to person, place, and time.  Patient appears well-developed and well-nourished. No distress.   HENT:   Head: Normocephalic and atraumatic.   Eyes: Conjunctivae are normal.   Cardiovascular: Normal rate.    Pulmonary/Chest: Effort normal.   Neurological: She is alert and oriented to person, place, and time.   musculoskeletal -  left Knee -  no TTP.   There is no swelling.   There is no erythema.  There is no crepitus.  Varus and valgus stress tests negative. Lachman, anterior/posterior drawer test negative  Strength: Flexion 5/5, extension 5/5     Skin: Skin is warm. Patient is not diaphoretic. No erythema.   Nursing note and vitals reviewed.              Assessment/Plan:      1. Knee strain, left, subsequent encounter    Improved  Trial full duty  F/u one wk      "

## 2024-12-04 ENCOUNTER — OCCUPATIONAL MEDICINE (OUTPATIENT)
Dept: URGENT CARE | Facility: PHYSICIAN GROUP | Age: 49
End: 2024-12-04
Payer: COMMERCIAL

## 2024-12-04 VITALS
WEIGHT: 148 LBS | HEIGHT: 66 IN | TEMPERATURE: 97.4 F | BODY MASS INDEX: 23.78 KG/M2 | DIASTOLIC BLOOD PRESSURE: 70 MMHG | SYSTOLIC BLOOD PRESSURE: 126 MMHG | OXYGEN SATURATION: 98 % | HEART RATE: 96 BPM | RESPIRATION RATE: 16 BRPM

## 2024-12-04 DIAGNOSIS — S86.912D KNEE STRAIN, LEFT, SUBSEQUENT ENCOUNTER: ICD-10-CM

## 2024-12-04 PROCEDURE — 99214 OFFICE O/P EST MOD 30 MIN: CPT

## 2024-12-04 PROCEDURE — 3078F DIAST BP <80 MM HG: CPT

## 2024-12-04 PROCEDURE — 3074F SYST BP LT 130 MM HG: CPT

## 2024-12-04 RX ORDER — IBUPROFEN 600 MG/1
600 TABLET, FILM COATED ORAL EVERY 6 HOURS PRN
Qty: 30 TABLET | Refills: 0 | Status: SHIPPED | OUTPATIENT
Start: 2024-12-04

## 2024-12-04 ASSESSMENT — FIBROSIS 4 INDEX: FIB4 SCORE: 0.9

## 2024-12-04 NOTE — LETTER
"PHYSICIAN’S AND CHIROPRACTIC PHYSICIAN'S   PROGRESS REPORT   CERTIFICATION OF DISABILITY Claim Number:     Social Security Number:    Patient’s Name: JEANINE GARRISON Date of Injury: 11/20/2024   Employer: [employer] Name of MCO (if applicable):      Patient’s Job Description/Occupation:        Previous Injuries/Diseases/Surgeries Contributing to the Condition:  None      Diagnosis: (S86.972V) Knee strain, left, subsequent encounter      Related to the Industrial Injury? Yes     Explain: Copied \"DOI:   11/20        F/u on left knee strain        States pain improved.         Complains of left knee pain for 3 wks.  Denies any precipitating injury or accident.  Describes pain as dull and achy.  Pain does not radiate.  Pain is somewhat better with motrin.    S/p total left knee replacement 10 yrs ago\"      4th visit: Reports that day one of full duty symptoms worsened significantly. Patient has had to wear knee brace, applying ice, and CBD lotion.  Previous provider noted a knee replacement 10 years ago which was not correct and Patient has never had a previous issue with this knee. Patient does report he is able to bear weight but does have pain with walking for long periods with any bending of the leg Patient has reproducible pain to medial aspect. Patient denies any swelling. No reported numbness or tingling.            Objective Medical Findings: Physical Exam  Vitals and nursing note reviewed.   Constitutional:       General: He is not in acute distress.     Appearance: Normal appearance. He is not ill-appearing.   HENT:      Head: Normocephalic and atraumatic.      Right Ear: Tympanic membrane normal.      Left Ear: Tympanic membrane normal.      Nose: Nose normal.      Mouth/Throat:      Mouth: Mucous membranes are moist.      Pharynx: Oropharynx is clear.   Eyes:      Conjunctiva/sclera: Conjunctivae normal.      Pupils: Pupils are equal, round, and reactive to light. "   Cardiovascular:      Rate and Rhythm: Normal rate.      Heart sounds: Normal heart sounds.   Pulmonary:      Effort: Pulmonary effort is normal.      Breath sounds: Normal breath sounds.   Abdominal:      General: Abdomen is flat.      Palpations: Abdomen is soft.   Musculoskeletal:      Left knee: No swelling, deformity, effusion or bony tenderness. Decreased range of motion. Tenderness present over the medial joint line. Normal pulse.   Skin:     General: Skin is warm and dry.      Capillary Refill: Capillary refill takes less than 2 seconds.   Neurological:      Mental Status: He is alert and oriented to person, place, and time.   Psychiatric:         Mood and Affect: Mood normal.         Behavior: Behavior normal.              None - Discharged                         Stable  No                 Ratable  No        Generally Improved                      X   Condition Worsened                  Condition Same  May Have Suffered a Permanent Disability No     Treatment Plan:    Continue use of knee brace, ice, and CBD cream. Patient to start Ibuprofen as needed.          No Change in Therapy                  PT/OT Prescribed                      Medication May be Used While Working        Case Management                          PT/OT Discontinued    Consultation    Further Diagnostic Studies:    Prescription(s) Orthopedic         Ibuprofen 600mg q6h      Released to FULL DUTY /No Restrictions on (Date):       Certified TOTALLY TEMPORARILY DISABLED (Indicate Dates) From:   To:    X  Released to RESTRICTED/Modified Duty on (Date): From: 12/4/2024 To:    Restrictions Are:  Temporary      No Sitting    No Standing    No Pulling Other: No bending or stooping and avoid use of stairs until cleared by orthopedics.    X    No Bending at Waist X    No Stooping     No Lifting        No Carrying     No Walking Lifting Restricted to (lbs.):          No Pushing     X   No Climbing     No Reaching Above Shoulders       Date of  Next Visit:    Date of this Exam: 12/4/2024 Physician/Chiropractic Physician Name: NISHA Kim Physician/Chiropractic Physician Signature:  Javier Martines DO MPH     [place]:  [facility]  [facility], [address] [place], [place] [address] - Telephone [phone] [place]:  [address] [place] [address], [address] [place], [place] [address] - Telephone [phone]    [email or web address]  D-39 (Rev. 10/24)

## 2024-12-05 NOTE — PROGRESS NOTES
"Subjective:     eCsar James is a 49 y.o. male who presents for Follow-Up (W/C (L) knee)    Copied \"DOI:   11/20        F/u on left knee strain        States pain improved.         Complains of left knee pain for 3 wks.  Denies any precipitating injury or accident.  Describes pain as dull and achy.  Pain does not radiate.  Pain is somewhat better with motrin.    S/p total left knee replacement 10 yrs ago\"      4th visit: Reports that day one of full duty symptoms worsened significantly. Patient has had to wear knee brace, applying ice, and CBD lotion.  Previous provider noted a knee replacement 10 years ago which was not correct and Patient has never had a previous issue with this knee. Patient does report he is able to bear weight but does have pain with walking for long periods with any bending of the leg Patient has reproducible pain to medial aspect. Patient denies any swelling. No reported numbness or tingling.        PMH:   No pertinent past medical history to this problem  MEDS:  Medications were reviewed in EMR  ALLERGIES:  Allergies were reviewed in EMR  SOCHX:  Works as a   FH:   No pertinent family history to this problem       Objective:     /70   Pulse 96   Temp 36.3 °C (97.4 °F) (Temporal)   Resp 16   Ht 1.676 m (5' 6\")   Wt 67.1 kg (148 lb)   SpO2 98%   BMI 23.89 kg/m²     Physical Exam  Vitals and nursing note reviewed.   Constitutional:       General: He is not in acute distress.     Appearance: Normal appearance. He is not ill-appearing.   HENT:      Head: Normocephalic and atraumatic.      Right Ear: Tympanic membrane normal.      Left Ear: Tympanic membrane normal.      Nose: Nose normal.      Mouth/Throat:      Mouth: Mucous membranes are moist.      Pharynx: Oropharynx is clear.   Eyes:      Conjunctiva/sclera: Conjunctivae normal.      Pupils: Pupils are equal, round, and reactive to light.   Cardiovascular:      Rate and Rhythm: Normal rate.      Heart sounds: Normal " heart sounds.   Pulmonary:      Effort: Pulmonary effort is normal.      Breath sounds: Normal breath sounds.   Abdominal:      General: Abdomen is flat.      Palpations: Abdomen is soft.   Musculoskeletal:      Left knee: No swelling, deformity, effusion or bony tenderness. Decreased range of motion. Tenderness present over the medial joint line. Normal pulse.   Skin:     General: Skin is warm and dry.      Capillary Refill: Capillary refill takes less than 2 seconds.   Neurological:      Mental Status: He is alert and oriented to person, place, and time.   Psychiatric:         Mood and Affect: Mood normal.         Behavior: Behavior normal.         Assessment/Plan:       1. Knee strain, left, subsequent encounter  - Referral to Orthopedics  - Knee Brace  - ibuprofen (MOTRIN) 600 MG Tab; Take 1 Tablet by mouth every 6 hours as needed for Moderate Pain.  Dispense: 30 Tablet; Refill: 0    Patient at this time was placed back on work restrictions.  See D39 for details.  Patient has been using over-the-counter knee brace and at this time I did provide patient a hinged knee brace.  Patient instructed to wear consistently until he is able to follow-up with orthopedics.  Unfortunately we do not have x-ray at this time and patient has had no previous x-rays done.  Patient is able to bear weight but still feels like he has instability when he goes to bend knee side-to-side.  Patient has not been taking any over-the-counter analgesics and at this time I did provide patient prescription for 600 mg of ibuprofen.  Patient instructed to take as prescribed and continue current regimen.    Patient to follow-up with orthopedics for further management of case.    Differential diagnosis, natural history, supportive care, and indications for immediate follow-up discussed.

## 2025-01-06 ENCOUNTER — HOSPITAL ENCOUNTER (OUTPATIENT)
Dept: LAB | Facility: MEDICAL CENTER | Age: 50
End: 2025-01-06
Payer: COMMERCIAL

## 2025-01-06 PROCEDURE — 86706 HEP B SURFACE ANTIBODY: CPT

## 2025-01-06 PROCEDURE — 86708 HEPATITIS A ANTIBODY: CPT

## 2025-01-06 PROCEDURE — 87902 NFCT AGT GNTYP ALYS HEP C: CPT

## 2025-01-06 PROCEDURE — 87340 HEPATITIS B SURFACE AG IA: CPT

## 2025-01-06 PROCEDURE — 36415 COLL VENOUS BLD VENIPUNCTURE: CPT

## 2025-01-06 PROCEDURE — 86704 HEP B CORE ANTIBODY TOTAL: CPT

## 2025-01-07 LAB
HBV CORE AB SERPL QL IA: NONREACTIVE
HBV SURFACE AB SERPL IA-ACNC: <3.5 MIU/ML (ref 0–10)
HBV SURFACE AG SER QL: NONREACTIVE

## 2025-01-08 LAB — HAV AB SER QL IA: NEGATIVE

## 2025-01-12 LAB — HCV GENTYP SERPL NAA+PROBE: NORMAL

## 2025-01-19 ENCOUNTER — APPOINTMENT (OUTPATIENT)
Dept: RADIOLOGY | Facility: MEDICAL CENTER | Age: 50
End: 2025-01-19
Payer: COMMERCIAL

## 2025-02-23 ENCOUNTER — HOSPITAL ENCOUNTER (OUTPATIENT)
Dept: RADIOLOGY | Facility: MEDICAL CENTER | Age: 50
End: 2025-02-23
Payer: COMMERCIAL

## 2025-02-23 DIAGNOSIS — B18.2 CHRONIC HEPATITIS C WITH HEPATIC COMA (HCC): ICD-10-CM

## 2025-02-23 PROCEDURE — 76700 US EXAM ABDOM COMPLETE: CPT
